# Patient Record
Sex: FEMALE | Race: WHITE | NOT HISPANIC OR LATINO
[De-identification: names, ages, dates, MRNs, and addresses within clinical notes are randomized per-mention and may not be internally consistent; named-entity substitution may affect disease eponyms.]

---

## 2021-03-16 PROBLEM — Z00.00 ENCOUNTER FOR PREVENTIVE HEALTH EXAMINATION: Status: ACTIVE | Noted: 2021-03-16

## 2021-03-25 PROBLEM — Z82.5 FAMILY HISTORY OF CHRONIC OBSTRUCTIVE PULMONARY DISEASE: Status: ACTIVE | Noted: 2021-03-23

## 2021-03-25 PROBLEM — Z80.3 FAMILY HISTORY OF MALIGNANT NEOPLASM OF BREAST: Status: ACTIVE | Noted: 2021-03-23

## 2021-03-25 PROBLEM — Z83.79 FAMILY HISTORY OF COLONIC DIVERTICULITIS: Status: ACTIVE | Noted: 2021-03-23

## 2021-03-25 PROBLEM — Z83.3 FAMILY HISTORY OF DIABETES MELLITUS: Status: ACTIVE | Noted: 2021-03-23

## 2021-03-25 PROBLEM — Z82.61 FAMILY HISTORY OF ARTHRITIS: Status: ACTIVE | Noted: 2021-03-23

## 2021-03-25 PROBLEM — Z80.1 FAMILY HISTORY OF LUNG CANCER: Status: ACTIVE | Noted: 2021-03-23

## 2021-03-25 PROBLEM — Z82.5 FAMILY HISTORY OF ASTHMA: Status: ACTIVE | Noted: 2021-03-23

## 2021-03-25 PROBLEM — Z80.0 FAMILY HISTORY OF MALIGNANT NEOPLASM OF STOMACH: Status: ACTIVE | Noted: 2021-03-23

## 2021-03-25 PROBLEM — Z80.42 FAMILY HISTORY OF MALIGNANT NEOPLASM OF PROSTATE: Status: ACTIVE | Noted: 2021-03-23

## 2021-03-25 PROBLEM — Z82.49 FAMILY HISTORY OF HEART MURMUR: Status: ACTIVE | Noted: 2021-03-23

## 2021-03-26 ENCOUNTER — APPOINTMENT (OUTPATIENT)
Dept: COLORECTAL SURGERY | Facility: CLINIC | Age: 51
End: 2021-03-26
Payer: MEDICARE

## 2021-03-26 VITALS
SYSTOLIC BLOOD PRESSURE: 142 MMHG | BODY MASS INDEX: 36.86 KG/M2 | WEIGHT: 208 LBS | HEART RATE: 108 BPM | TEMPERATURE: 98.2 F | HEIGHT: 63 IN | DIASTOLIC BLOOD PRESSURE: 79 MMHG

## 2021-03-26 DIAGNOSIS — Z80.0 FAMILY HISTORY OF MALIGNANT NEOPLASM OF DIGESTIVE ORGANS: ICD-10-CM

## 2021-03-26 DIAGNOSIS — Z83.79 FAMILY HISTORY OF OTHER DISEASES OF THE DIGESTIVE SYSTEM: ICD-10-CM

## 2021-03-26 DIAGNOSIS — Z80.3 FAMILY HISTORY OF MALIGNANT NEOPLASM OF BREAST: ICD-10-CM

## 2021-03-26 DIAGNOSIS — Z83.3 FAMILY HISTORY OF DIABETES MELLITUS: ICD-10-CM

## 2021-03-26 DIAGNOSIS — Z82.5 FAMILY HISTORY OF ASTHMA AND OTHER CHRONIC LOWER RESPIRATORY DISEASES: ICD-10-CM

## 2021-03-26 DIAGNOSIS — Z82.61 FAMILY HISTORY OF ARTHRITIS: ICD-10-CM

## 2021-03-26 DIAGNOSIS — Z82.49 FAMILY HISTORY OF ISCHEMIC HEART DISEASE AND OTHER DISEASES OF THE CIRCULATORY SYSTEM: ICD-10-CM

## 2021-03-26 DIAGNOSIS — Z80.42 FAMILY HISTORY OF MALIGNANT NEOPLASM OF PROSTATE: ICD-10-CM

## 2021-03-26 DIAGNOSIS — Z80.1 FAMILY HISTORY OF MALIGNANT NEOPLASM OF TRACHEA, BRONCHUS AND LUNG: ICD-10-CM

## 2021-03-26 PROCEDURE — 99204 OFFICE O/P NEW MOD 45 MIN: CPT

## 2021-03-26 RX ORDER — ELECTROLYTES/DEXTROSE
10 SOLUTION, ORAL ORAL
Refills: 0 | Status: ACTIVE | COMMUNITY

## 2021-03-26 RX ORDER — OXYBUTYNIN CHLORIDE 15 MG/1
15 TABLET, EXTENDED RELEASE ORAL
Refills: 0 | Status: ACTIVE | COMMUNITY

## 2021-03-26 RX ORDER — MERCAPTOPURINE 50 MG/1
50 TABLET ORAL
Refills: 0 | Status: ACTIVE | COMMUNITY

## 2021-03-26 NOTE — ASSESSMENT
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 Need for biological replacement and abdominal wall reconstruction outlined. Patient will seek care with hernia surgeon and will consider possible second opinion.

## 2021-03-26 NOTE — PHYSICAL EXAM
[Abdomen Masses] : No abdominal masses [Abdomen Tenderness] : ~T ~M Abdominal tenderness [de-identified] : Multiple scars with anterior abdominal wall with IR  drain in place. Right lower quadrant hernia

## 2021-03-26 NOTE — HISTORY OF PRESENT ILLNESS
[FreeTextEntry1] : 50 yo F presents for evaluation of Crohn's disease\par \par Initially diagnosed with Crohn's disease , currently on Remicade q 4 weeks and Mercaptopurine, followed by BENJAMIN Cortez\par PMH of AVM, seizures, aneurysm, desmoid tumor, PE 2017\par PSH of surgery for AVM 1997, coil placement for aneurysm May 1997, LEEP procedure for dysplasia 2001, , perianal rectal and vaginal reconstructive surgery for fistula 2007, laparoscopic hernia repair 17, emergency colostomy creation 17 for perforated transverse colon and DVT, colostomy reversed approx , possible in office wound debridement near former ostomy site w/ Dr. Ricks (unknown year), IR drainage of anterior abdominal wall fluid 20 and 21, abdominal catheter for fluid collection 21.\par \par Pt presents today to discuss abdominal abscesses, she continues to be followed by Gen surgeon Dr. Ricks and has a f/u on  regarding drain\par Reports symptoms began in December w/ mid abdominal pain and LLQ pelvic pain. Denies hx of fevers, nausea or vomiting.\par Has been followed by Dr. Ricks who ordered CT imaging and referred to IR for drainage.\par Pt was previously on Augmentin x 14 days, then switched to Levaquin in January x 4-6 weeks, however stopped medication last month as per Ortho/Gen Surgeon due to knee pain from prolonged use\par Pt has been having approximately monthly imaging included CT or abd US typically at [facility] and occasionally at [facility]. \par Drain continues to have brown tinged clear discharge\par Pt reports slightly soreness at former colostomy bag which has occurred intermittently over the last couple of years.\par Denies fever, n/v/c/d\par Denies passing flatus or stool via vagina or urethra, denies urine via anus\par BH: 1-2 times no complaints\par Reports adequate dietary fiber, admits could improve water intake\par Denies fiber supplement or stool softener use\par \par Last colonoscopy completed 2020\par Has not completed MRE or CTE lately\par FMH of stomach cancer in maternal grandmother, lung and prostate caner in maternal uncle, breast cancer in maternal great aunt\par Denies ASA/NSAIDs last 7 days. Takes Tylenol as needed

## 2021-05-03 ENCOUNTER — APPOINTMENT (OUTPATIENT)
Dept: SURGERY | Facility: CLINIC | Age: 51
End: 2021-05-03
Payer: MEDICARE

## 2021-05-03 VITALS
HEART RATE: 120 BPM | DIASTOLIC BLOOD PRESSURE: 84 MMHG | BODY MASS INDEX: 37.39 KG/M2 | OXYGEN SATURATION: 98 % | WEIGHT: 211 LBS | HEIGHT: 63 IN | TEMPERATURE: 97.3 F | SYSTOLIC BLOOD PRESSURE: 139 MMHG

## 2021-05-03 DIAGNOSIS — Z86.69 PERSONAL HISTORY OF OTHER DISEASES OF THE NERVOUS SYSTEM AND SENSE ORGANS: ICD-10-CM

## 2021-05-03 DIAGNOSIS — K63.2 FISTULA OF INTESTINE: ICD-10-CM

## 2021-05-03 DIAGNOSIS — K46.9 UNSPECIFIED ABDOMINAL HERNIA W/OUT OBSTRUCTION OR GANGRENE: ICD-10-CM

## 2021-05-03 DIAGNOSIS — Z86.718 PERSONAL HISTORY OF OTHER VENOUS THROMBOSIS AND EMBOLISM: ICD-10-CM

## 2021-05-03 PROCEDURE — 99205 OFFICE O/P NEW HI 60 MIN: CPT

## 2021-05-06 NOTE — REVIEW OF SYSTEMS
[Recent Weight Gain (___ Lbs)] : recent [unfilled] ~Ulb weight gain [Abdominal Pain] : abdominal pain [Incontinence] : incontinence [Joint Pain] : joint pain [Anxiety] : anxiety [Joint Swelling] : joint swelling [Depression] : depression [Negative] : Heme/Lymph

## 2021-05-07 PROBLEM — K63.2 INTESTINAL FISTULA: Status: RESOLVED | Noted: 2021-05-07 | Resolved: 2021-05-07

## 2021-05-07 PROBLEM — Z86.718 HISTORY OF DEEP VENOUS THROMBOSIS: Status: RESOLVED | Noted: 2021-05-07 | Resolved: 2021-05-07

## 2021-05-07 PROBLEM — Z86.69 HISTORY OF SEIZURE DISORDER: Status: RESOLVED | Noted: 2021-05-07 | Resolved: 2021-05-07

## 2021-05-07 PROBLEM — K63.2 ABDOMINAL WALL FISTULA: Status: RESOLVED | Noted: 2021-05-03 | Resolved: 2021-05-07

## 2021-05-07 PROBLEM — K46.9 HERNIA: Status: ACTIVE | Noted: 2021-05-03

## 2021-05-07 NOTE — ASSESSMENT
[FreeTextEntry1] : 52 y/o female with hx of complex abdominal surgeries \par \par Plan\par -CT Scan abd/pelvis - Review\par \par -Used CEDAR trang with patient to discuss and assess risk of complications to surgery at her current weight \par -Weight loss, decrease BMI with a goal of <30 for her to be medically optimized - Discussed with her that weight loss prior to surgery will be helpful.\par \par -She has follow up appoinment with  next week \par \par -Patient will send prior operative reports\par \par -Patient will send recent ultrasound and fistulogram report \par \par - Likely will require abdominal exploration and bowel resection given likely fistula of intestine - question of foreign body related to mesh - need additional information. Discuss with Dr. Echeverria.

## 2021-05-07 NOTE — HISTORY OF PRESENT ILLNESS
[de-identified] : This is a 50 y/o female with a complex abdominal surgical history presents to for evaluation and management of  abdominal wall abscesses/hernia. Reports left groin pain, mid abdominal pain and abdominal drainage started in 2020. She currently is being followed by her general surgeon, . Has a drainage catheter for chronic abdominal abscesses, placed in 2021. Last CT scan in . \par \par Initially diagnosed with Crohn's disease , currently on Remicade q 4 weeks and Mercaptopurine, followed by BENJAMIN Cortez\par PMH of AVM, seizures, aneurysm, desmoid tumor, PE 2017\par \par PSH of surgery for AVM 1997, coil placement for aneurysm May 1997, LEEP procedure for dysplasia 2001, , perianal rectal and vaginal reconstructive surgery for fistula 2007, laparoscopic hernia repair 17, emergency colostomy creation 17 for perforated transverse colon and DVT, colostomy reversed approx , possible in office wound debridement near former ostomy site w/ Dr. Ricks (unknown year), IR drainage of anterior abdominal wall fluid 20 and 21, abdominal catheter for fluid collection 21.\par \par Pt presents today to discuss abdominal abscesses, she continues to be followed by Gen surgeon Dr. Ricks and has a f/u soon.\par \par Reports symptoms began in December w/ mid abdominal pain and LLQ pelvic pain. Denies hx of fevers, nausea or vomiting.\par \par Has been followed by Dr. Ricks who ordered CT imaging and referred to IR for drainage.\par Pt was previously on Augmentin x 14 days, then switched to Levaquin in January x 4-6 weeks, however stopped medication last month as per Ortho/Gen Surgeon due to knee pain from prolonged use\par \par Pt has been having approximately monthly imaging included CT or abd US typically at [facility] and occasionally at [facility]. - Does not believe she has had an actual CT since \par Drain continues to have brown tinged clear discharge\par Pt reports slightly soreness at former colostomy bag which has occurred intermittently over the last couple of years.\par Denies fever, n/v/c/d\par Denies passing flatus or stool via vagina or urethra, denies urine via anus\par

## 2021-05-07 NOTE — PHYSICAL EXAM
[Normal Breath Sounds] : Normal breath sounds [Normal Heart Sounds] : normal heart sounds [Normal Rate and Rhythm] : normal rate and rhythm [Purpura] : no purpura  [Alert] : alert [Oriented to Person] : oriented to person [Oriented to Place] : oriented to place [Oriented to Time] : oriented to time [Calm] : calm [de-identified] : NAD, comfortable [de-identified] : Normocephalic, atraumatic. No scleral icterus.  [de-identified] : Supple, no JVD or cervical lymphadenopathy.  [de-identified] : No respiratory distress.  [de-identified] : +BS soft, nondistended. Abdominal tenderness. Multiple healed incisions. Abdominal IR drainage catheter in place.

## 2021-05-10 ENCOUNTER — RESULT REVIEW (OUTPATIENT)
Age: 51
End: 2021-05-10

## 2021-05-10 ENCOUNTER — OUTPATIENT (OUTPATIENT)
Dept: OUTPATIENT SERVICES | Facility: HOSPITAL | Age: 51
LOS: 1 days | End: 2021-05-10

## 2021-05-10 ENCOUNTER — APPOINTMENT (OUTPATIENT)
Dept: CT IMAGING | Facility: CLINIC | Age: 51
End: 2021-05-10
Payer: MEDICARE

## 2021-05-10 PROCEDURE — G1004: CPT

## 2021-05-10 PROCEDURE — 74177 CT ABD & PELVIS W/CONTRAST: CPT | Mod: 26,MG

## 2021-05-13 ENCOUNTER — NON-APPOINTMENT (OUTPATIENT)
Age: 51
End: 2021-05-13

## 2021-05-21 ENCOUNTER — APPOINTMENT (OUTPATIENT)
Dept: COLORECTAL SURGERY | Facility: CLINIC | Age: 51
End: 2021-05-21
Payer: MEDICARE

## 2021-05-21 VITALS
SYSTOLIC BLOOD PRESSURE: 130 MMHG | WEIGHT: 212 LBS | HEIGHT: 63 IN | BODY MASS INDEX: 37.56 KG/M2 | HEART RATE: 101 BPM | TEMPERATURE: 98.1 F | DIASTOLIC BLOOD PRESSURE: 84 MMHG

## 2021-05-21 PROCEDURE — 99214 OFFICE O/P EST MOD 30 MIN: CPT | Mod: 25

## 2021-05-21 PROCEDURE — 46040 I&D ISCHIORCT&/PERIRCT ABSC: CPT

## 2021-05-21 NOTE — HISTORY OF PRESENT ILLNESS
[FreeTextEntry1] : 50 y/o F presents for f/u Crohn's disease\par Initially diagnosed with Crohn's disease , currently on Remicade q 4 weeks and Mercaptopurine, followed by BENJAMIN Cortez\par PMH of AVM, seizures, aneurysm, desmoid tumor, PE 2017\par \par PSH of surgery for AVM 1997, coil placement for aneurysm May 1997, LEEP procedure for dysplasia 2001, , perianal rectal and vaginal reconstructive surgery for fistula 2007, laparoscopic hernia repair 17, emergency colostomy creation 17 for perforated transverse colon and DVT, colostomy reversed approx , possible in office wound debridement near former ostomy site w/ Dr. Ricks (unknown year), IR drainage of anterior abdominal wall fluid 20 and 21, abdominal catheter for fluid collection 21.\par \par Last seen in the office on 3/26/21 with c/f abdominal hernia. On exam, abdominal tenderness noted. Multiple scars with anterior abdominal wall with IR drain in place and a right lower quadrant hernia observed. Patient advised to seek consultation with Dr. Reynoso for hernia repair\par Initial consultation completed with Dr. Reynoso 5/3/21. She was advised on weight loss and following up with our office to discuss exploratory laparotomy and possible bowel resection \par \par \par U/s abdominal wall completed 3/19/21 at [facility]\par - surgical mesh in the anterior abdominal wall with no large fluid collections seen\par CT A/P completed 5/10/21\par - Large left lower quadrant hernia containing non-obstructed loops of small bowel.\par - Status post partial resection and anastomosis of the transverse colon.\par - Pigtail drain in the abdominal wall without residual fluid collection.\par - Subcentimeter patchy/nodular opacity in the right lung base could be due to focal atelectasis. Consider follow-up CT in 3-6 months.\par \par Reports Dr. Ricks ordered a specialized study to evaluate for abdominal fistula. 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 Ricks. Report 5-10 cc of coffee colored drainage from drain BID \par C/o issues with hemorrhoid for the past 2 days. Hemorrhoids are causing aching pain with Bm's. Denies rectal itching or bleeding. Has started using topical ointments with mild improvement in symptoms\par BH: 1-2 times daily. Soft and formed\par Denies constipation, diarrhea or straining\par Reports adequate dietary fiber intake. Currently drinking three 16 oz. bottles of water daily and occasionally several cups of lemon water \par Last colonoscopy completed 2020\par FMH of stomach cancer in maternal grandmother, lung and prostate caner in maternal uncle, breast cancer in maternal great aunt\par No ASA/NSAIDs last 7 days

## 2021-05-21 NOTE — PHYSICAL EXAM
[Abdomen Masses] : No abdominal masses [Abdomen Tenderness] : ~T ~M Abdominal tenderness [Excoriation] : no perianal excoriation [Fistula] : a fistula [3:00] : in the 3:00 position [Normal] : was normal [Anterior] : anteriorly [None] : there was no rectal mass  [de-identified] : Multiple scars with anterior abdominal wall with IR  drain in place. Right lower quadrant hernia [de-identified] : anterior anal verge-abscess.  The risks and befits of the treatment plan were reviewed and outlined with the patient. The patient understands the associated risks of the involved treatment and wishes to proceed. All questions were answered and explained.\par .  I and D performed with 3 cc  purulent material

## 2021-05-21 NOTE — ASSESSMENT
[FreeTextEntry1] : Advised local wound care. Risk of potential underlying secondary fistula due to the complex Crohn's disease was detailed.\par \par After thorough and detailed discussion we will proceed with hernia repair and possible small bowel resection after she has considerable weight loss. Likely will require 3-40 pound weight loss.\par \par 30 minutes was spent with the patient including evaluation and counseling.\par \par

## 2021-05-27 ENCOUNTER — APPOINTMENT (OUTPATIENT)
Dept: THORACIC SURGERY | Facility: CLINIC | Age: 51
End: 2021-05-27
Payer: MEDICARE

## 2021-05-27 VITALS
TEMPERATURE: 98.4 F | HEART RATE: 84 BPM | OXYGEN SATURATION: 97 % | BODY MASS INDEX: 37.74 KG/M2 | WEIGHT: 213 LBS | DIASTOLIC BLOOD PRESSURE: 84 MMHG | SYSTOLIC BLOOD PRESSURE: 145 MMHG | RESPIRATION RATE: 17 BRPM | HEIGHT: 63 IN

## 2021-05-27 PROCEDURE — 99205 OFFICE O/P NEW HI 60 MIN: CPT

## 2021-05-28 NOTE — HISTORY OF PRESENT ILLNESS
[FreeTextEntry1] : 51 year old female, never smoker, with a PMHx of Crohns disease ( on Remicade q 4 weeks and Mercaptopurine), AVM, seizures, aneurysm, desmoid tumor, PE 2017 LEEP procedure for dysplasia 2001, , perianal rectal and vaginal reconstructive surgery for fistula 2007, laparoscopic hernia repair 17, emergency colostomy creation 17 for perforated transverse colon and DVT, colostomy reversed approx , possible in office wound debridement near former ostomy site w/ Dr. Ricks (unknown year), IR drainage of anterior abdominal wall fluid 20 and 21, abdominal catheter for chronic abdominal abscess 21. Has plans to undergo hernia repair and possible small bowel resection with Dr. Echeverria after she loses weight. Recent CT abdomen and pelvis revealed nodular opacity in right lung base. She presents today for an initial evaluation. She is referred by Dr. Reynoso.\par \par U/s abdominal wall completed 3/19/21 at [facility]\par - surgical mesh in the anterior abdominal wall with no large fluid collections seen\par CT A/P completed 5/10/21\par - Large left lower quadrant hernia containing non-obstructed loops of small bowel.\par - Status post partial resection and anastomosis of the transverse colon.\par - Pigtail drain in the abdominal wall without residual fluid collection.\par - Subcentimeter patchy/nodular opacity in the right lung base could be due to focal atelectasis. Consider follow-up CT in 3-6 months.\par \par CT abd and pelvis completed on 5/10/21:\par -Large left lower quadrant hernia containing non obstructed loops of small bowel.\par -Status post partial resection and anastomosis of the transverse colon.\par -Pigtail drain in the abdominal wall without residual fluid collection.\par -Subcentimeter patchy/nodular opacity in the right lung base could be due to focal atelectasis. Consider follow-up CT in 3-6 months.\par \par \par \par \par \par \par \par

## 2021-05-28 NOTE — ASSESSMENT
[FreeTextEntry1] : 51 year old female, never smoker, with a PMHx of Crohns disease ( on Remicade q 4 weeks and Mercaptopurine), AVM, seizures, aneurysm, desmoid tumor, PE 2017 LEEP procedure for dysplasia 2001, , perianal rectal and vaginal reconstructive surgery for fistula 2007, laparoscopic hernia repair 17, emergency colostomy creation 17 for perforated transverse colon and DVT, colostomy reversed approx , possible in office wound debridement near former ostomy site w/ Dr. Ricks (unknown year), IR drainage of anterior abdominal wall fluid 20 and 21, abdominal catheter for chronic abdominal abscess 21. Has plans to undergo hernia repair and possible small bowel resection with Dr. Echeverria after she loses weight. Recent CT abdomen and pelvis revealed nodular opacity in right lung base. She presents today for an initial evaluation. She is referred by Dr. Reynoso.\par \par Patient feeling well. Denies SOB, fever/chills. \par \par CT Abdomen/Pelvis 5/10/21 reveals patchy opacity in the right lung base likely infectious/inflammatory. Right lung patchy opacity found incidentally on CT abdomen done for hernia repair. Will order dedicated CT Chest. \par \par I have reviewed the patient's medical records and diagnostic images at the time of this office consultation and have made the following recommendation.\par Plan:\par 1. CT Chest\par 2. Request old films from [facility]\par \par I reviewed the documentation recorded by the scribe in my presence and it accurately and completely records my words and actions\par \par

## 2021-05-28 NOTE — END OF VISIT
[Time Spent: ___ minutes] : I have spent [unfilled] minutes of time on the encounter. [FreeTextEntry3] : I, DEREK EDDY , am scribing for and in the presence of SANAZ ANTON the following sections: history of present illness, past medical/family/surgical/family/social history, review of systems, vital signs, physical exam, and disposition.\par

## 2021-05-28 NOTE — PHYSICAL EXAM
[] : no respiratory distress [Respiration, Rhythm And Depth] : normal respiratory rhythm and effort [Auscultation Breath Sounds / Voice Sounds] : lungs were clear to auscultation bilaterally [Apical Impulse] : the apical impulse was normal [Heart Sounds] : normal S1 and S2 [Examination Of The Chest] : the chest was normal in appearance [Abnormal Walk] : normal gait [Musculoskeletal - Swelling] : no joint swelling seen [Skin Color & Pigmentation] : normal skin color and pigmentation [Skin Turgor] : normal skin turgor [No Focal Deficits] : no focal deficits [FreeTextEntry1] : hernia

## 2021-06-05 ENCOUNTER — RESULT REVIEW (OUTPATIENT)
Age: 51
End: 2021-06-05

## 2021-06-05 ENCOUNTER — APPOINTMENT (OUTPATIENT)
Dept: CT IMAGING | Facility: CLINIC | Age: 51
End: 2021-06-05
Payer: MEDICARE

## 2021-06-05 PROCEDURE — 71250 CT THORAX DX C-: CPT | Mod: 26,MH

## 2021-06-11 ENCOUNTER — APPOINTMENT (OUTPATIENT)
Dept: COLORECTAL SURGERY | Facility: CLINIC | Age: 51
End: 2021-06-11
Payer: MEDICARE

## 2021-06-11 VITALS
SYSTOLIC BLOOD PRESSURE: 152 MMHG | TEMPERATURE: 98 F | BODY MASS INDEX: 38.27 KG/M2 | DIASTOLIC BLOOD PRESSURE: 95 MMHG | WEIGHT: 216 LBS | HEART RATE: 98 BPM | HEIGHT: 63 IN

## 2021-06-11 DIAGNOSIS — K61.0 ANAL ABSCESS: ICD-10-CM

## 2021-06-11 PROCEDURE — 99024 POSTOP FOLLOW-UP VISIT: CPT

## 2021-06-11 NOTE — PHYSICAL EXAM
[Abdomen Masses] : No abdominal masses [Abdomen Tenderness] : ~T ~M Abdominal tenderness [Excoriation] : no perianal excoriation [Fistula] : no fistulas [Normal] : was normal [Anterior] : anteriorly [None] : there was no rectal mass  [de-identified] : Multiple scars with anterior abdominal wall with IR  drain in place. Right lower quadrant hernia [de-identified] : No evidence of residual abscess. Well-healed incision and drainage site

## 2021-06-11 NOTE — HISTORY OF PRESENT ILLNESS
[FreeTextEntry1] : 50 y/o F presents for f/u Crohn's disease\par Initially diagnosed with Crohn's disease , currently on Remicade q 4 weeks and Mercaptopurine, followed by BENJAMIN Cortez\par PMH of AVM, seizures, aneurysm, desmoid tumor, PE 2017\par \par PSH of surgery for AVM 1997, coil placement for aneurysm May 1997, LEEP procedure for dysplasia 2001, , perianal rectal and vaginal reconstructive surgery for fistula 2007, laparoscopic hernia repair 17, emergency colostomy creation 17 for perforated transverse colon and DVT, colostomy reversed approx , possible in office wound debridement near former ostomy site w/ Dr. Ricks (unknown year), IR drainage of anterior abdominal wall fluid 20 and 21, abdominal catheter for fluid collection 21.\par \par \par U/s abdominal wall completed 3/19/21 at [facility]\par - surgical mesh in the anterior abdominal wall with no large fluid collections seen\par \par CT A/P completed 5/10/21\par - Large left lower quadrant hernia containing non-obstructed loops of small bowel.\par - Status post partial resection and anastomosis of the transverse colon.\par - Pigtail drain in the abdominal wall without residual fluid collection.\par - Subcentimeter patchy/nodular opacity in the right lung base could be due to focal atelectasis. Consider follow-up CT in 3-6 months.\par \par Initial consultation completed with Dr. Reynoso 5/3/21 for discussion of hernia repair. She was advised on weight loss and following up with our office to discuss combined case, including exploratory laparotomy and possible bowel resection \par \par Last seen in the office 21. Anterior abdominal wall with IR drain in place. Right lower quadrant hernia noted and on the keyanna-anal skin, a fistula in the 3:00 position and anterior anal verge-abscess. I&D performed\par \par Reports some tenderness and discomfort for the first several days after I&D\par Admits to occasional BRBPR on the TP with some but not all BM's\par Noted drainage from site for the first several days after I&D, now resolved\par Denies fever, chills, or odor\par No sitz baths \par BH: Daily\par Denies constipation,diarrhea or straining. Stools are soft and formed, occasional small amount of mucous with stools.Denies FU/FI \par No ASA last 7 days. Taking Aleve PRN for knee swelling, last taken yesterday

## 2021-06-11 NOTE — ASSESSMENT
[FreeTextEntry1] : No evidence of residual inflammation. Recommend continued observation. She will return in 3 months to address abdominal wall chronic fistula.\par

## 2021-06-18 ENCOUNTER — TRANSCRIPTION ENCOUNTER (OUTPATIENT)
Age: 51
End: 2021-06-18

## 2021-12-01 ENCOUNTER — APPOINTMENT (OUTPATIENT)
Dept: CT IMAGING | Facility: CLINIC | Age: 51
End: 2021-12-01
Payer: MEDICARE

## 2021-12-01 ENCOUNTER — RESULT REVIEW (OUTPATIENT)
Age: 51
End: 2021-12-01

## 2021-12-01 ENCOUNTER — OUTPATIENT (OUTPATIENT)
Dept: OUTPATIENT SERVICES | Facility: HOSPITAL | Age: 51
LOS: 1 days | End: 2021-12-01

## 2021-12-01 PROCEDURE — 71250 CT THORAX DX C-: CPT | Mod: 26,MH

## 2021-12-08 ENCOUNTER — NON-APPOINTMENT (OUTPATIENT)
Age: 51
End: 2021-12-08

## 2022-03-07 ENCOUNTER — APPOINTMENT (OUTPATIENT)
Dept: CT IMAGING | Facility: CLINIC | Age: 52
End: 2022-03-07
Payer: MEDICARE

## 2022-03-07 ENCOUNTER — OUTPATIENT (OUTPATIENT)
Dept: OUTPATIENT SERVICES | Facility: HOSPITAL | Age: 52
LOS: 1 days | End: 2022-03-07

## 2022-03-07 PROCEDURE — G1004: CPT

## 2022-03-07 PROCEDURE — 71250 CT THORAX DX C-: CPT | Mod: 26,MG

## 2022-03-09 ENCOUNTER — NON-APPOINTMENT (OUTPATIENT)
Age: 52
End: 2022-03-09

## 2022-03-14 DIAGNOSIS — R91.8 OTHER NONSPECIFIC ABNORMAL FINDING OF LUNG FIELD: ICD-10-CM

## 2022-03-24 ENCOUNTER — APPOINTMENT (OUTPATIENT)
Dept: THORACIC SURGERY | Facility: CLINIC | Age: 52
End: 2022-03-24
Payer: MEDICARE

## 2022-03-24 VITALS
HEIGHT: 63 IN | SYSTOLIC BLOOD PRESSURE: 116 MMHG | OXYGEN SATURATION: 96 % | DIASTOLIC BLOOD PRESSURE: 67 MMHG | WEIGHT: 221 LBS | BODY MASS INDEX: 39.16 KG/M2 | RESPIRATION RATE: 18 BRPM | HEART RATE: 88 BPM | TEMPERATURE: 97.5 F

## 2022-03-24 DIAGNOSIS — R91.8 OTHER NONSPECIFIC ABNORMAL FINDING OF LUNG FIELD: ICD-10-CM

## 2022-03-24 PROCEDURE — 99213 OFFICE O/P EST LOW 20 MIN: CPT

## 2022-03-24 RX ORDER — TRAMADOL HYDROCHLORIDE 50 MG/1
50 TABLET, COATED ORAL 4 TIMES DAILY
Refills: 0 | Status: ACTIVE | COMMUNITY

## 2022-03-24 RX ORDER — FUROSEMIDE 20 MG/1
20 TABLET ORAL
Refills: 0 | Status: DISCONTINUED | COMMUNITY
End: 2022-03-24

## 2022-03-24 RX ORDER — SUMATRIPTAN 100 MG/1
100 TABLET, FILM COATED ORAL
Refills: 0 | Status: ACTIVE | COMMUNITY

## 2022-03-24 RX ORDER — ADALIMUMAB 20MG/0.4ML
KIT SUBCUTANEOUS
Refills: 0 | Status: DISCONTINUED | COMMUNITY
End: 2022-03-24

## 2022-03-24 RX ORDER — TOPIRAMATE 100 MG/1
100 CAPSULE, EXTENDED RELEASE ORAL DAILY
Refills: 0 | Status: ACTIVE | COMMUNITY

## 2022-03-24 NOTE — PHYSICAL EXAM
[Neck Appearance] : the appearance of the neck was normal [Neck Cervical Mass (___cm)] : no neck mass was observed [Jugular Venous Distention Increased] : there was no jugular-venous distention [Thyroid Diffuse Enlargement] : the thyroid was not enlarged [Thyroid Nodule] : there were no palpable thyroid nodules [Auscultation Breath Sounds / Voice Sounds] : lungs were clear to auscultation bilaterally [] : no respiratory distress

## 2022-03-26 NOTE — HISTORY OF PRESENT ILLNESS
[FreeTextEntry1] : 52 year old female, never smoker, with a PMHx of Crohns disease ( on Remicade q 4 weeks and Mercaptopurine), AVM, seizures, aneurysm, desmoid tumor, PE 2017,  LEEP procedure for dysplasia 2001, , perianal rectal and vaginal reconstructive surgery for fistula 2007, laparoscopic hernia repair 17, emergency colostomy creation 17 for perforated transverse colon, and DVT, colostomy reversed approx , possible in office wound debridement near former ostomy site w/ Dr. Ricks (unknown year), IR drainage of anterior abdominal wall fluid 20 and 21, abdominal catheter for chronic abdominal abscess 21. Has plans to undergo hernia repair and possible small bowel resection with Dr. Echeverria. \par \par She was referred by Dr. Reynoso to Dr. Chamberlain in May 2021 for right lung base nodular opacity, incidentally found on CT abd and pelvis. She presents today to review recent CT chest. Patient reports feeling well in general. There's no fever, chills, fatigue, unintentional weight loss, anorexia, cough, chest pain, SOB, hemoptysis. \par \par U/s abdominal wall completed 3/19/21 at [facility]\par - surgical mesh in the anterior abdominal wall with no large fluid collections seen\par CT A/P completed 5/10/21\par - Large left lower quadrant hernia containing non-obstructed loops of small bowel.\par - Status post partial resection and anastomosis of the transverse colon.\par - Pigtail drain in the abdominal wall without residual fluid collection.\par - Subcentimeter patchy/nodular opacity in the right lung base could be due to focal atelectasis. Consider follow-up CT in 3-6 months.\par \par CT abd and pelvis completed on 5/10/21:\par -Large left lower quadrant hernia containing non obstructed loops of small bowel.\par -Status post partial resection and anastomosis of the transverse colon.\par -Pigtail drain in the abdominal wall without residual fluid collection.\par -Subcentimeter patchy/nodular opacity in the right lung base could be due to focal atelectasis. Consider follow-up CT in 3-6 months.\par \par CT chest on 21\par - [id number] nodular opacity overlying the diaphragm in the RLL, previously measured [id number] on 21. \par \par CT chest done on 3/7/22:\par - Since 2021, there has been a decrease in the size of a tubular opacity in the posterior right lower lobe, likely representing atelectasis or scar rather than a lung nodule.\par \par \par

## 2022-03-26 NOTE — ASSESSMENT
[FreeTextEntry1] : 52 year old female, never smoker, known to our service for surveillance of a pulmonary nodule. History of Crohn disease, followed by Dr. Echeverria. \par \par She was referred by Dr. Reynoso to Dr. Chamberlain in May 2021 for right lung base nodular opacity, incidentally found on CT abd and pelvis.\par \par CT chest on 12/01/21\par - [id number] nodular opacity overlying the diaphragm in the RLL, previously measured [id number] on 6/5/21. \par \par She presents today to review recent CT chest. Patient reports feeling well in general. There's no fever, chills, fatigue, unintentional weight loss, anorexia, cough, chest pain, SOB, hemoptysis. \par \par CT chest done on 3/7/22:\par - Since 12/1/2021, there has been a decrease in the size of a tubular opacity in the posterior right lower lobe, likely representing atelectasis or scar rather than a lung nodule.\par \par CT chest on 3/7/22 reviewed and discussed with the patient. RLL opacity decreased in size, atelectasis most likely. Patient feels well in general.  Will continue observe. Will obtain CT in 6 months to insure resolution. \par \par Plan:\par Repeat CT chest in 6 months. \par \par BHASKAR Castanon, was scribe [and  if needed] for and in the presence of Dr. CHRIS OROURKE for the following sections: history of present illness, past medical/surgical/family/social/ allergy history, review of systems, vital signs, physical exam, and disposition.\par \par MARKUS, Chris Orourke,  personally performed the services described in the documentation, reviewed the documentation recorded by the scribe in my presence and it accurately and completely records my words and actions.\par \par \par

## 2022-04-29 ENCOUNTER — APPOINTMENT (OUTPATIENT)
Dept: SURGERY | Facility: CLINIC | Age: 52
End: 2022-04-29
Payer: MEDICARE

## 2022-04-29 VITALS
HEART RATE: 94 BPM | HEIGHT: 63 IN | DIASTOLIC BLOOD PRESSURE: 94 MMHG | WEIGHT: 222 LBS | TEMPERATURE: 97.6 F | OXYGEN SATURATION: 97 % | BODY MASS INDEX: 39.34 KG/M2 | SYSTOLIC BLOOD PRESSURE: 130 MMHG

## 2022-04-29 PROCEDURE — 99213 OFFICE O/P EST LOW 20 MIN: CPT

## 2022-05-02 NOTE — REVIEW OF SYSTEMS
[Recent Weight Gain (___ Lbs)] : recent [unfilled] ~Ulb weight gain [Abdominal Pain] : abdominal pain [Incontinence] : incontinence [Joint Swelling] : joint swelling [Anxiety] : anxiety [Depression] : depression [Negative] : Heme/Lymph

## 2022-05-19 ENCOUNTER — APPOINTMENT (OUTPATIENT)
Dept: BARIATRICS | Facility: CLINIC | Age: 52
End: 2022-05-19
Payer: MEDICARE

## 2022-05-19 VITALS
BODY MASS INDEX: 38.45 KG/M2 | SYSTOLIC BLOOD PRESSURE: 101 MMHG | OXYGEN SATURATION: 96 % | TEMPERATURE: 97.6 F | HEART RATE: 97 BPM | WEIGHT: 217 LBS | DIASTOLIC BLOOD PRESSURE: 72 MMHG | HEIGHT: 63 IN

## 2022-05-19 PROCEDURE — 99214 OFFICE O/P EST MOD 30 MIN: CPT

## 2022-05-20 ENCOUNTER — NON-APPOINTMENT (OUTPATIENT)
Age: 52
End: 2022-05-20

## 2022-05-20 ENCOUNTER — APPOINTMENT (OUTPATIENT)
Dept: COLORECTAL SURGERY | Facility: CLINIC | Age: 52
End: 2022-05-20
Payer: MEDICARE

## 2022-05-20 VITALS
HEIGHT: 63 IN | HEART RATE: 111 BPM | BODY MASS INDEX: 38.09 KG/M2 | DIASTOLIC BLOOD PRESSURE: 78 MMHG | TEMPERATURE: 97.2 F | SYSTOLIC BLOOD PRESSURE: 125 MMHG | WEIGHT: 215 LBS

## 2022-05-20 PROCEDURE — 99214 OFFICE O/P EST MOD 30 MIN: CPT

## 2022-05-20 NOTE — PHYSICAL EXAM
[Abdomen Masses] : No abdominal masses [Abdomen Tenderness] : ~T ~M Abdominal tenderness [Excoriation] : no perianal excoriation [Fistula] : no fistulas [Normal] : was normal [Anterior] : anteriorly [None] : there was no rectal mass  [de-identified] : Multiple scars with anterior abdominal wall -evidence of small draining fistula at umbilicus.  Mild surrounding erythema.  Skin grossly intact.

## 2022-05-20 NOTE — HISTORY OF PRESENT ILLNESS
[FreeTextEntry1] : 51 yo F presents for f/u Crohn's disease\par Initially diagnosed with Crohn's disease , currently on Remicade q 4 weeks and Mercaptopurine, followed by BENJAMIN Cortez. Due for next infusion 22\par PMH of AVM, seizures, aneurysm, desmoid tumor, PE 2017\par \par PSH of surgery for AVM 1997, coil placement for aneurysm May 1997, LEEP procedure for dysplasia 2001, , perianal rectal and vaginal reconstructive surgery for fistula 2007, laparoscopic hernia repair 17, emergency colostomy creation 17 for perforated transverse colon and DVT, colostomy reversed approx , possible in office wound debridement near former ostomy site w/ Dr. Ricks (unknown year), IR drainage of anterior abdominal wall fluid 20 and 21, abdominal catheter for fluid collection 21.\par \par \par U/s abdominal wall completed 3/19/21 at [facility]\par - surgical mesh in the anterior abdominal wall with no large fluid collections seen\par \par CT A/P completed 5/10/21\par - Large left lower quadrant hernia containing non-obstructed loops of small bowel.\par - Status post partial resection and anastomosis of the transverse colon.\par - Pigtail drain in the abdominal wall without residual fluid collection.\par - Subcentimeter patchy/nodular opacity in the right lung base could be due to focal atelectasis. Consider follow-up CT in 3-6 months.\par \par Initial consultation completed with Dr. Reynoso 5/3/21 for discussion of hernia repair. She was advised on weight loss and following up with our office to discuss combined case, including exploratory laparotomy and possible bowel resection \par \par Seen in the office 21. Anterior abdominal wall with IR drain in place. Right lower quadrant hernia noted and on the keyanna-anal skin, a fistula in the 3:00 position and anterior anal verge-abscess. I&D performed\par Last seen 21 for follow up. Multiple scar w/ anterior abdominal wall w/ IR drain in place. RLQ hernia. No evidence of residual abscess in perianal skin. Well healed I&D site, no fistulas. Normal sphincter tone. + anterior rectal tenderness to palpation\par \par Pt recommended to f/u in 3 months to address abd wall chronic fistula\par \par In interim, referred by Dr. Reynoso to CT Dr. Shabazz and most recently seen by Dr. Christian 3/24/22 for incidental right lung base nodular opacity noted on CT a/p, s/p CT chest 2021 and repeat in 2022:\par \par CT chest on 21\par - [id number] nodular opacity overlying the diaphragm in the RLL, previously measured [id number] on 21. \par \par CT chest done on 3/7/22:\par - Since 2021, there has been a decrease in the size of a tubular opacity in the posterior right lower lobe, likely representing atelectasis or scar rather than a lung nodule.\par \par Per Dr. Christian's notes, recommend repeat CT chest in 6 months\par \par Last seen by Dr. Reynoso on 22. Pt reported difficulty with weight loss and continued interest in abdominal wall surgery planning. Referred to bariatrics for evaluation of surgical vs medical weight loss prior to consideration of abdominal wall repair. \par \par Seen by Bariatrics Dr. Winslow on 22. Per Bariatrics, would consider sleeve gastrectomy, however given increased risk recommend medical weight loss and referred to Endocrinology\par \par Pt has f/u with Endo Dr. Palmer in July. Concerned about potential complications of bariatric surgery given h/o IBD and prefers to losing weight on her own\par \par Also seen by GEN SURG Dr. Salcedo a few weeks ago, c/o internal stabbing pain at former ostomy site. Denies fever, chills, n/v/c/d. Provider prescribed Augmentin x 1 week for possible infection which patient completed.\par \par Pt presents to revisit discussion and reports BENJAMIN Cortez wanted to know if she needs to repeat colonoscopy sooner than 2022. Last colonoscopy 2 years ago which was normal, pt reports h/o colon polyps on prior scope. \par \par BH: twice daily, soft/formed\par Denies blood or mucus in stool\par Not taking fiber or stool softeners\par \par Admits soda is her "crutch", admits to drinking rum and cokes. Last had soda one week ago and Is trying to increase water intake and avoiding fast food. \par Denies receiving referral to nutrition, pt interested in Nutritional contact\par Walks her dog daily\par \par Pt reports slimy discharge w/ vinegar like odor. Reports changing dry gauze on abdomen every 2-3 days or sooner if Tegaderm peels off sooner. When due for change will allow soap and water over the area during shower\par Admits to redness surrounding fistula, which she attributes to irritation from removal adhesive. Denies itching, burning.\par Denies ASA/NSAID use

## 2022-05-20 NOTE — ASSESSMENT
[FreeTextEntry1] : Complex enterocutaneous fistula with morbid obesity.\par \par Currently clinically stable.  Recommend continued efforts at weight loss.  Advised return in 4 months for repeat evaluation.  I have discussed with her the options for possible enterostomal consultation and pouching systems to help better address her leakage and protect her skin.  She will consider these options and be in contact.

## 2022-05-23 NOTE — HISTORY OF PRESENT ILLNESS
[de-identified] : This is a 50 y/o female with a complex abdominal surgical history presents to for evaluation and management of  abdominal wall abscesses/hernia. Reports left groin pain, mid abdominal pain and abdominal drainage started in 2020. She currently is being followed by her general surgeon, . Has a drainage catheter for chronic abdominal abscesses, placed in 2021. Last CT scan in . \par \par Initially diagnosed with Crohn's disease , currently on Remicade q 4 weeks and Mercaptopurine, followed by BENJAMIN Cortez\par PMH of AVM, seizures, aneurysm, desmoid tumor, PE 2017\par \par PSH of surgery for AVM 1997, coil placement for aneurysm May 1997, LEEP procedure for dysplasia 2001, , perianal rectal and vaginal reconstructive surgery for fistula 2007, laparoscopic hernia repair 17, emergency colostomy creation 17 for perforated transverse colon and DVT, colostomy reversed approx , possible in office wound debridement near former ostomy site w/ Dr. Ricks (unknown year), IR drainage of anterior abdominal wall fluid 20 and 21, abdominal catheter for fluid collection 21.\par \par Pt presents today to discuss abdominal abscesses, she continues to be followed by Gen surgeon Dr. Ricks and has a f/u soon.\par \par Reports symptoms began in December w/ mid abdominal pain and LLQ pelvic pain. Denies hx of fevers, nausea or vomiting.\par \par Has been followed by Dr. Ricks who ordered CT imaging and referred to IR for drainage.\par Pt was previously on Augmentin x 14 days, then switched to Levaquin in January x 4-6 weeks, however stopped medication last month as per Ortho/Gen Surgeon due to knee pain from prolonged use\par \par Pt has been having approximately monthly imaging included CT or abd US typically at [facility] and [facility] at Health system. - Does not believe she has had an actual CT since \par Drain continues to have brown tinged clear discharge\par Pt reports slightly soreness at former colostomy bag which has occurred intermittently over the last couple of years.\par Denies fever, n/v/c/d\par Denies passing flatus or stool via vagina or urethra, denies urine via anus\par  [de-identified] : 4-: Returns to office. Overall about the same. Has had significant trouble losing weight. Remains with BMI of ~40 (5'3"  222lbs). Does have some persistent evidence of fistula vs foreign body infection of abdominal wall that has been managed by local wound care. Wishes to re-engage regarding abdominal wall.

## 2022-05-23 NOTE — PHYSICAL EXAM
[Normal Breath Sounds] : Normal breath sounds [Normal Heart Sounds] : normal heart sounds [Normal Rate and Rhythm] : normal rate and rhythm [Alert] : alert [Oriented to Person] : oriented to person [Oriented to Place] : oriented to place [Oriented to Time] : oriented to time [Calm] : calm [Abdominal Masses] : No abdominal masses [Abdomen Tenderness] : ~T ~M No abdominal tenderness [Tender] : was nontender [Enlarged] : not enlarged [Purpura] : no purpura  [de-identified] : NAD, comfortable [de-identified] : Normocephalic, atraumatic. No scleral icterus.  [de-identified] : Supple, no JVD or cervical lymphadenopathy.  [de-identified] : No respiratory distress.  [de-identified] : +BS soft, nondistended. Abdominal tenderness. Multiple healed incisions. Purulent fistula present.

## 2022-05-23 NOTE — ASSESSMENT
[FreeTextEntry1] : 50 y/o female with hx of complex abdominal surgeries, and chrohns disease. Fistula. We discussed that given her current situation, risk of wound complication is extraordinarily high for abdominal wall reconstructive surgery. Weight reduction should signficantly reduce her change of complication and may improve overall health trajectory. She certainly may be a candidate for weight loss options weather surgical or endoscopic given her failure with tradititional diet and exercise. I will refer to Dr. Winslow for evaluation. \par \par Discussed referral to bariatric surgery as she is interested in options for weight loss. \par

## 2022-05-26 NOTE — ASSESSMENT
[FreeTextEntry1] : Pt is a 53 y/o F with complex abdominal surgical history with abdominal wall abscesses/hernia who was referred here by Dr. Reynoso who wants to do an operation to resect small bowel but would like patient to lose weight first. Discussed with patient how if she were to undergo a bariatric surgery, I would recommend sleeve gastrectomy because of her complex abdominal surgical history and pathologies. Informed patient this surgery would carry greater risk than sleeve gastrectomies normally do. Therefore, referred patient to medical weight loss. She will follow up PRN.\par

## 2022-05-26 NOTE — END OF VISIT
[Time Spent: ___ minutes] : I have spent [unfilled] minutes of time on the encounter. [FreeTextEntry3] : All medical record entries made by the Scribe were at my, Dr. Winslow's, discretion and personally dictated by me on 05/19/2022. I have reviewed the chart and agree that the record accurately reflects my personal performance of the history, physical exam, assessment and plan. I have also personally directed, reviewed and agreed to the chart.

## 2022-05-26 NOTE — PHYSICAL EXAM
[Obese] : obese [Normal] : affect appropriate [de-identified] : normal respiration [de-identified] : large midline laparotomy from previous colostomy site

## 2022-05-26 NOTE — HISTORY OF PRESENT ILLNESS
[de-identified] : Pt is a 53 y/o F with complex abdominal surgical history with abdominal wall abscesses/hernia who was referred here by Dr. Reynoso who wants to do an operation to resect small bowel but would like patient to lose weight first. She currently weighs 217 lbs for BMI of 38 and reports she used to weigh ~140 lbs but gained weight over the past few years due to COVID and inactivity. Pt reports she's had a fistula since  and used to have a drain but it was removed. She reports changing the dressing every 2-3 days. Patient does Remicade treatments once a month for Crohn's and colitis. She has a h/o PE but is no longer on blood thinners. Pt reports she is seeing Dr. Berman tomorrow for further management. She plans to do a colonoscopy in August with her GI. Pt denies reflux, heartburn.\par Pt is s/p removal of portion of bowel with ostomy by Dr. Echeverria, desmoid tumor resection , . She has a h/o PE during one of her surgeries and h/o vaginorectal fistulas.

## 2022-06-10 ENCOUNTER — APPOINTMENT (OUTPATIENT)
Dept: SURGERY | Facility: CLINIC | Age: 52
End: 2022-06-10
Payer: MEDICARE

## 2022-06-10 VITALS
DIASTOLIC BLOOD PRESSURE: 98 MMHG | WEIGHT: 220.5 LBS | OXYGEN SATURATION: 96 % | TEMPERATURE: 95.4 F | HEART RATE: 104 BPM | BODY MASS INDEX: 39.07 KG/M2 | SYSTOLIC BLOOD PRESSURE: 141 MMHG | HEIGHT: 63 IN

## 2022-06-10 PROCEDURE — 99213 OFFICE O/P EST LOW 20 MIN: CPT

## 2022-06-15 NOTE — PHYSICAL EXAM
[Normal Breath Sounds] : Normal breath sounds [Normal Heart Sounds] : normal heart sounds [Normal Rate and Rhythm] : normal rate and rhythm [Abdominal Masses] : No abdominal masses [Abdomen Tenderness] : ~T ~M No abdominal tenderness [Tender] : was nontender [Enlarged] : not enlarged [Purpura] : no purpura  [Alert] : alert [Oriented to Person] : oriented to person [Oriented to Place] : oriented to place [Oriented to Time] : oriented to time [Calm] : calm [de-identified] : NAD, comfortable [de-identified] : Normocephalic, atraumatic. No scleral icterus.  [de-identified] : Supple, no JVD or cervical lymphadenopathy.  [de-identified] : No respiratory distress.  [de-identified] : +BS soft, nondistended. Abdominal tenderness. Multiple healed incisions. Purulent fistula present.

## 2022-06-15 NOTE — HISTORY OF PRESENT ILLNESS
[de-identified] : This is a 52 y/o female with a complex abdominal surgical history presents to for evaluation and management of  abdominal wall abscesses/hernia. Reports left groin pain, mid abdominal pain and abdominal drainage started in 2020. She currently is being followed by her general surgeon, . Has a drainage catheter for chronic abdominal abscesses, placed in 2021. Last CT scan in . \par \par Initially diagnosed with Crohn's disease , currently on Remicade q 4 weeks and Mercaptopurine, followed by BENJAMIN Cortez\par PMH of AVM, seizures, aneurysm, desmoid tumor, PE 2017\par \par PSH of surgery for AVM 1997, coil placement for aneurysm May 1997, LEEP procedure for dysplasia 2001, , perianal rectal and vaginal reconstructive surgery for fistula 2007, laparoscopic hernia repair 17, emergency colostomy creation 17 for perforated transverse colon and DVT, colostomy reversed approx , possible in office wound debridement near former ostomy site w/ Dr. Ricks (unknown year), IR drainage of anterior abdominal wall fluid 20 and 21, abdominal catheter for fluid collection 21.\par \par Pt presents today to discuss abdominal abscesses, she continues to be followed by Gen surgeon Dr. Ricks and has a f/u soon.\par \par Reports symptoms began in December w/ mid abdominal pain and LLQ pelvic pain. Denies hx of fevers, nausea or vomiting.\par \par Has been followed by Dr. Ricks who ordered CT imaging and referred to IR for drainage.\par Pt was previously on Augmentin x 14 days, then switched to Levaquin in January x 4-6 weeks, however stopped medication last month as per Ortho/Gen Surgeon due to knee pain from prolonged use\par \par Pt has been having approximately monthly imaging included CT or abd US typically at [facility] and occasionally at [facility]. - Does not believe she has had an actual CT since \par Drain continues to have brown tinged clear discharge\par Pt reports slightly soreness at former colostomy bag which has occurred intermittently over the last couple of years.\par Denies fever, n/v/c/d\par Denies passing flatus or stool via vagina or urethra, denies urine via anus\par  [de-identified] : 4-: Returns to office. Overall about the same. Has had significant trouble losing weight. Remains with BMI of ~40 (5'3"  222lbs). Does have some persistent evidence of fistula vs foreign body infection of abdominal wall that has been managed by local wound care. Wishes to re-engage regarding abdominal wall.\par \par 6- - Returns to office today. Continues to have difficulty with weight management. Saw Dr. Winslow, no bariatric surgery was offered. Discussed with her today to consider discussion with endocrinology regarding newer medical weight loss options. Overall abdominal wall remains the same.

## 2022-06-15 NOTE — ASSESSMENT
[FreeTextEntry1] : 52 y/o female with hx of complex abdominal surgeries, and chrohns disease. Fistula. We discussed that given her current situation, risk of wound complication is extraordinarily high for abdominal wall reconstructive surgery. Weight reduction should signficantly reduce her change of complication and may improve overall health trajectory. Continues to fail diet and exercise. She should discuss medical weight loss drugs with endocrinology.\par \par

## 2022-07-06 ENCOUNTER — APPOINTMENT (OUTPATIENT)
Dept: ENDOCRINOLOGY | Facility: CLINIC | Age: 52
End: 2022-07-06

## 2022-07-06 VITALS
TEMPERATURE: 97.4 F | OXYGEN SATURATION: 95 % | DIASTOLIC BLOOD PRESSURE: 85 MMHG | HEIGHT: 63 IN | WEIGHT: 220 LBS | BODY MASS INDEX: 38.98 KG/M2 | SYSTOLIC BLOOD PRESSURE: 123 MMHG | HEART RATE: 92 BPM

## 2022-07-06 PROCEDURE — 99205 OFFICE O/P NEW HI 60 MIN: CPT

## 2022-07-06 NOTE — ASSESSMENT
[Weight Loss] : weight loss [FreeTextEntry1] : Patient is a 53 yo woman establishing weight management\par \par 1. Class II Obesity/BMI 39\par Patient states struggles with weight loss over the past 2 years due to COVID/Quarantine and unemployment. Patient has a complicated hernia history and her surgeon recommends weight loss prior to hernia repair\par -nutritional counseling was recommended but states that nutrition does not accept insurance because she does not have diabetes or other qualifying diseases.  At this time, I will check an A1c level to rule out diabetes and/or prediabetes\par -discussed concept of calorie restriction. The patient's diet is high in sugars/carbohydrates and calories.  Namely, she is "addicted" to soda and we discussed the need to stop soda altogether.  Fast foods should be eliminated and she should have healthful lean proteins/complex carbohydrates and vegetables\par -medical management including metformin (use off label), Qsymia, Contrave, Saxenda were discussed. Most medicines are cost limiting but with side effects. We cannot do Contrave because of her history of seizures.  She is already on topiramate for migraines and again would not add phentermine due to PE/heart issues. Metformin is an option but side effect is diarrhea and she suffers from Crohn's Disease.  The only other potential option is GLP 1 agonist but this requires injections to abdominal wall region where she's got a chronic wound.  Finally, these medicines can be very cost limiting.\par -for now, check A1c and thyroid function tests.  Realistic option might be oral rybelsus but insurance may not cover this if she does not have diabetes\par \par Follow up in 4 weeks\par \par

## 2022-07-06 NOTE — CONSULT LETTER
[Dear  ___] : Dear  [unfilled], [Consult Letter:] : I had the pleasure of evaluating your patient, [unfilled]. [Please see my note below.] : Please see my note below. [Consult Closing:] : Thank you very much for allowing me to participate in the care of this patient.  If you have any questions, please do not hesitate to contact me. [Sincerely,] : Sincerely, [FreeTextEntry3] : Nubia Palmer MD [DrLaith  ___] : Dr. BRANDT

## 2022-07-06 NOTE — REASON FOR VISIT
[Initial Evaluation] : an initial evaluation [Weight Management/Obesity] : weight management/obesity [FreeTextEntry2] : Dr. Sher Winslow and Dr. Alex Reynoso

## 2022-07-06 NOTE — PHYSICAL EXAM
[Alert] : alert [No Acute Distress] : no acute distress [No Respiratory Distress] : no respiratory distress [No Accessory Muscle Use] : no accessory muscle use [Clear to Auscultation] : lungs were clear to auscultation bilaterally [Normal S1, S2] : normal S1 and S2 [Normal Rate] : heart rate was normal [Normal Bowel Sounds] : normal bowel sounds [Normal Gait] : normal gait [No Motor Deficits] : the motor exam was normal [Normal Affect] : the affect was normal [Normal Insight/Judgement] : insight and judgment were intact [Normal Mood] : the mood was normal [de-identified] : surgical scars, wound covered in tegaderm

## 2022-07-06 NOTE — HISTORY OF PRESENT ILLNESS
[FreeTextEntry1] : Patient is a 51 yo woman establishing endocrine care for weight management.\par \par Patient has a history of abdominal wall abscesses/complicated hernia, Crohn's Disease with weight struggles over a long period of time. Dr. Alex Reynoso wants to do hernia repairs but wants the patient to lose weight before proceeding.  Patient had consultation with bariatric surgery who recommends bariatric surgery.  Gastric sleeve was discussed.  Over the last two years, during COVID, had significant weight gain.  Patient had to stop working from the hernia and was basically depressed and eating while at home.  Patient recently got a dog and is walking it for exercise.  During COVID patient was drinking soda and reports it's "my drug of choice."  Patient was ordering fast food during quarantine and not moderating portions.  She has been looking for a nutritionist as well. She reports improved motivation to lose weight. Denies struggles with weight previous to two years ago.  Has been consistently a size 9-10 and averaging weight of 159.  Does not do WW or commercial meal programs.\par Breakfast: egg white and avoids bread/bagels; multigrain cheerios with 1% milk; tea with sugar and honey; cocoa in the winter.  Rare Starbucks\par Lunch: varies but hot dogs and side of fries\par Dinner: baked chicken, portion controlled rice; home made mashed potatoes; pasta with pesto with olive oil\par Gets outside foods about 3 times a week\par Walks dog twice a day for 20 minutes

## 2022-07-06 NOTE — REVIEW OF SYSTEMS
[Recent Weight Gain (___ Lbs)] : recent weight gain: [unfilled] lbs [Depression] : depression [Anxiety] : anxiety [Dysphagia] : no dysphagia [Dysphonia] : no dysphonia [Chest Pain] : no chest pain [Slow Heart Rate] : heart rate is not slow [Palpitations] : no palpitations [Fast Heart Rate] : heart rate is not fast [Shortness Of Breath] : no shortness of breath [Cough] : no cough [As Noted in HPI] : as noted in HPI [Nausea] : no nausea [Vomiting] : no vomiting [Diarrhea] : diarrhea [Ulcer] : ulcer [Headaches] : no headaches [Tremors] : no tremors [Cold Intolerance] : no cold intolerance [Heat Intolerance] : no heat intolerance

## 2022-07-07 LAB
ANION GAP SERPL CALC-SCNC: 9 MMOL/L
BUN SERPL-MCNC: 10 MG/DL
CALCIUM SERPL-MCNC: 9.5 MG/DL
CHLORIDE SERPL-SCNC: 102 MMOL/L
CO2 SERPL-SCNC: 26 MMOL/L
CREAT SERPL-MCNC: 0.74 MG/DL
EGFR: 98 ML/MIN/1.73M2
ESTIMATED AVERAGE GLUCOSE: 114 MG/DL
GLUCOSE SERPL-MCNC: 108 MG/DL
HBA1C MFR BLD HPLC: 5.6 %
POTASSIUM SERPL-SCNC: 4.5 MMOL/L
SODIUM SERPL-SCNC: 137 MMOL/L
T4 FREE SERPL-MCNC: 1.1 NG/DL
TSH SERPL-ACNC: 1.68 UIU/ML

## 2022-07-12 NOTE — ADDENDUM
[FreeTextEntry1] : This note was written by Belem Mccall on 05/19/2022 acting as scribe for Dr. Winslow.  Yes

## 2022-09-06 ENCOUNTER — APPOINTMENT (OUTPATIENT)
Dept: CT IMAGING | Facility: CLINIC | Age: 52
End: 2022-09-06

## 2022-09-06 ENCOUNTER — OUTPATIENT (OUTPATIENT)
Dept: OUTPATIENT SERVICES | Facility: HOSPITAL | Age: 52
LOS: 1 days | End: 2022-09-06

## 2022-09-06 PROCEDURE — 71250 CT THORAX DX C-: CPT | Mod: 26,MH

## 2022-09-08 ENCOUNTER — APPOINTMENT (OUTPATIENT)
Dept: THORACIC SURGERY | Facility: CLINIC | Age: 52
End: 2022-09-08

## 2022-09-08 PROCEDURE — 99441: CPT | Mod: 95

## 2022-09-08 NOTE — ASSESSMENT
[FreeTextEntry1] : 52 year old female, never smoker, with a PMHx of Crohns disease ( on Remicade q 4 weeks and Mercaptopurine), AVM, seizures, aneurysm, desmoid tumor, PE 2017, LEEP procedure for dysplasia 2001, , perianal rectal and vaginal reconstructive surgery for fistula 2007, laparoscopic hernia repair 17, emergency colostomy creation 17 for perforated transverse colon, and DVT, colostomy reversed approx , possible in office wound debridement near former ostomy site w/ Dr. Ricks (unknown year), IR drainage of anterior abdominal wall fluid 20 and 21, abdominal catheter for chronic abdominal abscess 21. Has plans to undergo hernia repair and possible small bowel resection with Dr. Echeverria. She presents today to review her CT chest. \par \par CT chest completed on 22:\par -recommended annual follow-up of a stable pulmonary nodule within the subpleural right lower lobe, to optimally document two-year stability. \par \par Patient denies cough, hemoptysis, shortness of breath, weight change, nausea, vomiting, diarrhea, chest pain, fever, or any recent illnesses or hospitalizations. \par \par CT chest was reviewed and is stable. Will plan for a CT chest in one year. Patient verbally understood the plan of care. \par \par Plan:\par 1. CT chest in one year \par

## 2022-09-08 NOTE — HISTORY OF PRESENT ILLNESS
[Home] : at home, [unfilled] , at the time of the visit. [Medical Office: ([facility])___] : at the medical office located in  [Verbal consent obtained from patient] : the patient, [unfilled] [FreeTextEntry1] : 52 year old female, never smoker, with a PMHx of Crohns disease ( on Remicade q 4 weeks and Mercaptopurine), AVM, seizures, aneurysm, desmoid tumor, PE 2017, LEEP procedure for dysplasia 2001, , perianal rectal and vaginal reconstructive surgery for fistula 2007, laparoscopic hernia repair 17, emergency colostomy creation 17 for perforated transverse colon, and DVT, colostomy reversed approx , possible in office wound debridement near former ostomy site w/ Dr. Ricks (unknown year), IR drainage of anterior abdominal wall fluid 20 and 21, abdominal catheter for chronic abdominal abscess 21. Has plans to undergo hernia repair and possible small bowel resection with Dr. Echeverria. She presents today to review her CT chest. \par \par CT chest completed on 22:\par -recommended annual follow-up of a stable pulmonary nodule within the subpleural right lower lobe, to optimally document two-year stability.

## 2022-09-09 ENCOUNTER — APPOINTMENT (OUTPATIENT)
Dept: ENDOCRINOLOGY | Facility: CLINIC | Age: 52
End: 2022-09-09

## 2022-09-09 VITALS
HEART RATE: 89 BPM | SYSTOLIC BLOOD PRESSURE: 124 MMHG | DIASTOLIC BLOOD PRESSURE: 84 MMHG | WEIGHT: 222 LBS | OXYGEN SATURATION: 96 % | HEIGHT: 63 IN | TEMPERATURE: 97.7 F | BODY MASS INDEX: 39.34 KG/M2

## 2022-09-09 PROCEDURE — 99214 OFFICE O/P EST MOD 30 MIN: CPT

## 2022-09-09 NOTE — ASSESSMENT
[FreeTextEntry1] : Patient is a 53 yo woman following up for weight management\par \par 1. Class II Obesity/BMI 39\par Patient states struggles with weight loss over the past 2 years due to COVID/Quarantine and unemployment. Patient has a complicated hernia history and her surgeon recommends weight loss prior to hernia repair\par -nutritional counseling was recommended but states that nutrition does not accept insurance because she does not have diabetes or other qualifying diseases. A1c at the last visit was normal\par -discussed concept of calorie restriction. The patient's diet is high in sugars/carbohydrates and calories. Namely, she is "addicted" to soda. Since the last visit, she has stopped drinking soda though she will have juice from time to time.  Diet remains fairly liberal\par -medical management including metformin (use off label), Qsymia, Contrave, Saxenda were discussed. Most medicines are cost limiting. We cannot do Contrave because of her history of seizures. She is already on topiramate for migraines and again would not add phentermine due to PE/heart issues. Metformin is an option but side effect is diarrhea and she suffers from Crohn's Disease. The only other potential option is GLP 1 agonist but this requires injections to abdominal wall region where she has chronic wounds.  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 GLP1 agonists not recommended. She's starting new medicine for Crohn's Disease and adding a medicine with side effects that outweigh benefit is not ideal at this time.\par \par Follow up with endocrine as needed\par \par  [Weight Loss] : weight loss

## 2022-09-09 NOTE — PHYSICAL EXAM
[Alert] : alert [No Acute Distress] : no acute distress [EOMI] : extra ocular movement intact [No Respiratory Distress] : no respiratory distress [No Accessory Muscle Use] : no accessory muscle use [Clear to Auscultation] : lungs were clear to auscultation bilaterally [Normal S1, S2] : normal S1 and S2 [Normal Rate] : heart rate was normal [Normal Bowel Sounds] : normal bowel sounds [Soft] : abdomen soft [Normal Gait] : normal gait [Acanthosis Nigricans] : acanthosis nigricans present [No Motor Deficits] : the motor exam was normal [Normal Affect] : the affect was normal [Normal Insight/Judgement] : insight and judgment were intact [Normal Mood] : the mood was normal

## 2022-09-09 NOTE — HISTORY OF PRESENT ILLNESS
[FreeTextEntry1] : Patient is a 51 yo woman following up for weight management.\par \par Patient has a history of abdominal wall abscesses/complicated hernia, Crohn's Disease with weight struggles over a long period of time. Dr. Alex Reynoso wants to do hernia repairs but wants the patient to lose weight before proceeding. Patient had consultation with bariatric surgery who recommends bariatric surgery. Gastric sleeve was discussed. Over the last two years, during COVID, had significant weight gain. Patient had to stop working from the hernia and was basically depressed and eating while at home. She recently got a dog and is walking it for exercise. During COVID patient was drinking soda and reports it's "my drug of choice." Patient was ordering fast food during quarantine and not moderating portions. She has been looking for a nutritionist as well. She reports improved motivation to lose weight. Denies struggles with weight previous to two years ago. Has been consistently a size 9-10 and averaging weight of 159. Does not do WW or commercial meal programs.\par She has cut back on soda but drinks cranberry juice from time to time.  Diet remains fairly unchanged but has things in moderation\par Endocrine care was established in July 2022 at which point we discussed the need for improving dietary choices. Medical management was discussed.  She is already on topiramate for migraines.  \par

## 2022-09-16 ENCOUNTER — APPOINTMENT (OUTPATIENT)
Dept: COLORECTAL SURGERY | Facility: CLINIC | Age: 52
End: 2022-09-16

## 2022-09-16 VITALS
HEIGHT: 63 IN | BODY MASS INDEX: 39.34 KG/M2 | WEIGHT: 222 LBS | DIASTOLIC BLOOD PRESSURE: 83 MMHG | HEART RATE: 103 BPM | TEMPERATURE: 97.9 F | SYSTOLIC BLOOD PRESSURE: 138 MMHG

## 2022-09-16 PROCEDURE — 99214 OFFICE O/P EST MOD 30 MIN: CPT

## 2022-09-16 NOTE — PHYSICAL EXAM
[Abdomen Masses] : No abdominal masses [Abdomen Tenderness] : ~T ~M Abdominal tenderness [Excoriation] : no perianal excoriation [Fistula] : a fistula [Normal] : was normal [Anterior] : anteriorly [None] : there was no rectal mass  [Alert] : alert [de-identified] : Multiple scars with anterior abdominal wall -evidence of small draining fistula at umbilicus.  Mild surrounding erythema.  Skin grossly intact. [de-identified] : Quiescent right and anterior/lateral labial fistula.  No erythema or induration. [de-identified] : Evidence of prior punctate skin abscess along anterior and medial thigh.  No significant tenderness or fluctuance.

## 2022-09-16 NOTE — ASSESSMENT
[FreeTextEntry1] : I reviewed with the patient that she will benefit from a potential use of Hibiclens soaps to treat her but possible recurrent skin infections.  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  If she develops further skin infections we will plan for prompt return for drainage and culture to exclude possible MRSA infection.\par \par She will continue with Crohn's therapy with her primary GI.

## 2022-09-16 NOTE — HISTORY OF PRESENT ILLNESS
[FreeTextEntry1] : 53 yo F presents for f/u Crohn's disease\par Initially diagnosed with Crohn's disease , currently on Remicade q 4 weeks and Mercaptopurine, followed by BENJAMIN Cortez. Due for next infusion 22\par PMH of AVM, seizures, aneurysm, desmoid tumor, PE 2017\par \par PSH of surgery for AVM 1997, coil placement for aneurysm May 1997, LEEP procedure for dysplasia 2001, , perianal rectal and vaginal reconstructive surgery for fistula 2007, laparoscopic hernia repair 17, emergency colostomy creation 17 for perforated transverse colon and DVT, colostomy reversed approx , possible in office wound debridement near former ostomy site w/ Dr. Ricks (unknown year), IR drainage of anterior abdominal wall fluid 20 and 21, abdominal catheter for fluid collection 21.\par \par \par U/s abdominal wall completed 3/19/21 at [facility]\par - surgical mesh in the anterior abdominal wall with no large fluid collections seen\par \par CT A/P completed 5/10/21\par - Large left lower quadrant hernia containing non-obstructed loops of small bowel.\par - Status post partial resection and anastomosis of the transverse colon.\par - Pigtail drain in the abdominal wall without residual fluid collection.\par - Subcentimeter patchy/nodular opacity in the right lung base could be due to focal atelectasis. Consider follow-up CT in 3-6 months.\par \par Initial consultation completed with Dr. Reynoso 5/3/21 for discussion of hernia repair. She was advised on weight loss and following up with our office to discuss combined case, including exploratory laparotomy and possible bowel resection \par \par Seen in the office 21. Anterior abdominal wall with IR drain in place. Right lower quadrant hernia noted and on the keyanna-anal skin, a fistula in the 3:00 position and anterior anal verge-abscess. I&D performed\par Last seen 21 for follow up. Multiple scar w/ anterior abdominal wall w/ IR drain in place. RLQ hernia. No evidence of residual abscess in perianal skin. Well healed I&D site, no fistulas. Normal sphincter tone. + anterior rectal tenderness to palpation\par \par Pt recommended to f/u in 3 months to address abd wall chronic fistula\par \par In interim, referred by Dr. Reynoso to CT Dr. Shabazz and most recently seen by Dr. Christian 3/24/22 for incidental right lung base nodular opacity noted on CT a/p, s/p CT chest 2021 and repeat in 2022:\par \par CT chest on 21\par - [id number] nodular opacity overlying the diaphragm in the RLL, previously measured [id number] on 21. \par \par CT chest done on 3/7/22:\par - Since 2021, there has been a decrease in the size of a tubular opacity in the posterior right lower lobe, likely representing atelectasis or scar rather than a lung nodule.\par \par Per Dr. Christian's notes, recommend repeat CT chest in 6 months\par \par Last seen by Dr. Reynoso on 22. Pt reported difficulty with weight loss and continued interest in abdominal wall surgery planning. Referred to bariatrics for evaluation of surgical vs medical weight loss prior to consideration of abdominal wall repair. \par \par Seen by Bariatrics Dr. Winslow on 22. Per Bariatrics, would consider sleeve gastrectomy, however given increased risk recommend medical weight loss and referred to Endocrinology\par \par Pt has f/u with Endo Dr. Palmer in July. Concerned about potential complications of bariatric surgery given h/o IBD and prefers to losing weight on her own\par \par Also seen by GEN SURG Dr. Salcedo a few weeks ago, c/o internal stabbing pain at former ostomy site. Denies fever, chills, n/v/c/d. Provider prescribed Augmentin x 1 week for possible infection which patient completed.\par \par Pt presents to revisit discussion and reports BENJAMIN Cortez wanted to know if she needs to repeat colonoscopy sooner than 2022. Last colonoscopy 2 years ago which was normal, pt reports h/o colon polyps on prior scope. \par \par BH: twice daily, soft/formed\par Denies blood or mucus in stool\par Not taking fiber or stool softeners\par \par \par Patient returns in follow-up.  Reports recent episode of developing anterior thigh and medial thigh abscesses that required the use of topical antiabscess medication.  The abscess spontaneously drained with resolution of the swelling.  Purulent discharge without feculent material noted.  Pain has resolved at this point.  Symptoms occurred 3 weeks prior.\par \par Recently saw primary GI .  Colonoscopy performed.  2 polyps removed she reports.  Was advised by primary GI that she will switch to dima Serge.\par \par \par

## 2022-09-22 ENCOUNTER — APPOINTMENT (OUTPATIENT)
Dept: DERMATOLOGY | Facility: CLINIC | Age: 52
End: 2022-09-22
Payer: MEDICARE

## 2022-09-22 PROCEDURE — 87075 CULTR BACTERIA EXCEPT BLOOD: CPT

## 2022-09-22 PROCEDURE — 99204 OFFICE O/P NEW MOD 45 MIN: CPT

## 2022-09-23 ENCOUNTER — APPOINTMENT (OUTPATIENT)
Dept: SURGERY | Facility: CLINIC | Age: 52
End: 2022-09-23

## 2022-09-23 VITALS
TEMPERATURE: 97.3 F | DIASTOLIC BLOOD PRESSURE: 88 MMHG | BODY MASS INDEX: 38.62 KG/M2 | WEIGHT: 218 LBS | HEIGHT: 63 IN | OXYGEN SATURATION: 99 % | SYSTOLIC BLOOD PRESSURE: 132 MMHG | HEART RATE: 83 BPM

## 2022-09-23 PROCEDURE — 99213 OFFICE O/P EST LOW 20 MIN: CPT

## 2022-09-24 ENCOUNTER — NON-APPOINTMENT (OUTPATIENT)
Age: 52
End: 2022-09-24

## 2022-09-26 NOTE — HISTORY OF PRESENT ILLNESS
[FreeTextEntry1] : ? 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

## 2022-09-26 NOTE — PHYSICAL EXAM
[Alert] : alert [Oriented x 3] : ~L oriented x 3 [Well Nourished] : well nourished [Conjunctiva Non-injected] : conjunctiva non-injected [No Visual Lymphadenopathy] : no visual  lymphadenopathy [No Clubbing] : no clubbing [No Edema] : no edema [No Bromhidrosis] : no bromhidrosis [No Chromhidrosis] : no chromhidrosis [FreeTextEntry3] : pih bilateral medial thighs without active furunculosis or folliculitis\par fistula anal area and abdomen with overlying bandage\par pink papules cheeks

## 2022-09-27 LAB
MRSA SPEC QL CULT: NOT DETECTED
STAPH AUREUS (SA): DETECTED

## 2022-09-27 RX ORDER — MUPIROCIN 20 MG/G
2 OINTMENT TOPICAL
Qty: 1 | Refills: 2 | Status: ACTIVE | OUTPATIENT
Start: 2022-09-27

## 2022-09-28 ENCOUNTER — NON-APPOINTMENT (OUTPATIENT)
Age: 52
End: 2022-09-28

## 2022-09-28 NOTE — HISTORY OF PRESENT ILLNESS
[de-identified] : This is a 52 y/o female with a complex abdominal surgical history presents to for evaluation and management of  abdominal wall abscesses/hernia. Reports left groin pain, mid abdominal pain and abdominal drainage started in 2020. She currently is being followed by her general surgeon, . Has a drainage catheter for chronic abdominal abscesses, placed in 2021. Last CT scan in . \par \par Initially diagnosed with Crohn's disease , currently on Remicade q 4 weeks and Mercaptopurine, followed by BENJAMIN Cortez\par PMH of AVM, seizures, aneurysm, desmoid tumor, PE 2017\par \par PSH of surgery for AVM 1997, coil placement for aneurysm May 1997, LEEP procedure for dysplasia 2001, , perianal rectal and vaginal reconstructive surgery for fistula 2007, laparoscopic hernia repair 17, emergency colostomy creation 17 for perforated transverse colon and DVT, colostomy reversed approx , possible in office wound debridement near former ostomy site w/ Dr. Ricks (unknown year), IR drainage of anterior abdominal wall fluid 20 and 21, abdominal catheter for fluid collection 21.\par \par Pt presents today to discuss abdominal abscesses, she continues to be followed by Gen surgeon Dr. Ricks and has a f/u soon.\par \par Reports symptoms began in December w/ mid abdominal pain and LLQ pelvic pain. Denies hx of fevers, nausea or vomiting.\par \par Has been followed by Dr. Ricks who ordered CT imaging and referred to IR for drainage.\par Pt was previously on Augmentin x 14 days, then switched to Levaquin in January x 4-6 weeks, however stopped medication last month as per Ortho/Gen Surgeon due to knee pain from prolonged use\par \par Pt has been having approximately monthly imaging included CT or abd US typically at [facility] and occasionally at [facility]. - Does not believe she has had an actual CT since \par Drain continues to have brown tinged clear discharge\par Pt reports slightly soreness at former colostomy bag which has occurred intermittently over the last couple of years.\par Denies fever, n/v/c/d\par Denies passing flatus or stool via vagina or urethra, denies urine via anus\par  [de-identified] : 4-: Returns to office. Overall about the same. Has had significant trouble losing weight. Remains with BMI of ~40 (5'3"  222lbs). Does have some persistent evidence of fistula vs foreign body infection of abdominal wall that has been managed by local wound care. Wishes to re-engage regarding abdominal wall.\par \par 6- - Returns to office today. Continues to have difficulty with weight management. Saw Dr. Winslow, no bariatric surgery was offered. Discussed with her today to consider discussion with endocrinology regarding newer medical weight loss options. Overall abdominal wall remains the same. \par \par 9-: Returns to office. She reports she is overall doing well today. She states she is continuing to struggle with weight loss, however she reports she has just joined a gym and is excited to get back into working out. She has seen dermatology for a recent episode of thigh abscess which spontaneously drained. Reports area is healing well with no pain or discomfort. She report she saw Dr.Lichtman mckenzie 1 month ago for discomfort at ostomy site and was prescribed antibiotics for possible infection. Reports she finished full course of antibiotics and pain has subsided. Continuing to see her GI doctor for management of crohns, she is switching medication of skyrizi. She denies any abdominal pain or discomfort. Denies any change in size of hernia. Continuing to change dressing daily.

## 2022-09-28 NOTE — ASSESSMENT
[FreeTextEntry1] : 51 y/o female with hx of complex abdominal surgeries, and Crohn's disease. Fistula. We discussed that given her current situation, risk of wound complication is extraordinarily high for abdominal wall reconstructive surgery. We discussed in detail weight loss prior to elective repair to reduce high risk of complications from surgery. She has just joined a gym and is ready to get back on track. Encouraged starting weekly routine at gym with cardiovascular exercises and muscle strength training. We discussed referral to dietician to aid with weight management. Referral provided and patient is set up with appointment. She will f/u with me in 3 months for check up. Discussed returning to office sooner if any new/worsening symptoms arise. She has my direct contact information.

## 2022-09-28 NOTE — PHYSICAL EXAM
[Normal Breath Sounds] : Normal breath sounds [Normal Heart Sounds] : normal heart sounds [Normal Rate and Rhythm] : normal rate and rhythm [Alert] : alert [Oriented to Person] : oriented to person [Oriented to Place] : oriented to place [Oriented to Time] : oriented to time [Calm] : calm [Abdominal Masses] : No abdominal masses [Abdomen Tenderness] : ~T ~M No abdominal tenderness [Tender] : was nontender [Enlarged] : not enlarged [Purpura] : no purpura  [de-identified] : NAD, comfortable [de-identified] : Normocephalic, atraumatic. No scleral icterus.  [de-identified] : Supple, no JVD or cervical lymphadenopathy.  [de-identified] : No respiratory distress.  [de-identified] : +BS soft, nondistended. Abdominal tenderness. Multiple healed incisions. Purulent fistula present, dressing in place.

## 2022-10-19 ENCOUNTER — NON-APPOINTMENT (OUTPATIENT)
Age: 52
End: 2022-10-19

## 2022-10-19 VITALS — BODY MASS INDEX: 38.27 KG/M2 | HEIGHT: 63 IN | WEIGHT: 216 LBS

## 2022-11-09 ENCOUNTER — NON-APPOINTMENT (OUTPATIENT)
Age: 52
End: 2022-11-09

## 2023-01-04 ENCOUNTER — APPOINTMENT (OUTPATIENT)
Dept: COLORECTAL SURGERY | Facility: CLINIC | Age: 53
End: 2023-01-04
Payer: MEDICARE

## 2023-01-04 VITALS
BODY MASS INDEX: 36.32 KG/M2 | HEIGHT: 63 IN | DIASTOLIC BLOOD PRESSURE: 90 MMHG | WEIGHT: 205 LBS | SYSTOLIC BLOOD PRESSURE: 130 MMHG | TEMPERATURE: 97.5 F | HEART RATE: 125 BPM

## 2023-01-04 DIAGNOSIS — L29.0 PRURITUS ANI: ICD-10-CM

## 2023-01-04 PROCEDURE — 99214 OFFICE O/P EST MOD 30 MIN: CPT

## 2023-01-04 NOTE — PHYSICAL EXAM
[Excoriation] : excoriations [Normal] : was normal [None] : there was no rectal mass  [de-identified] : mild.mod perianal excoriation ( left lateral anal margin and right posterior - evidence of quiescent anal fistula disease. No abscess.

## 2023-01-04 NOTE — ASSESSMENT
[FreeTextEntry1] : Advised the patient of the etiology and treatment of pruritus ani.  Recommendations including appropriate perianal hygiene changes, avoidance of soaps and astringents, lubricating lotions and avoidance of excessive wiping detailed.\par \par Advised increasing fiber regimen.\par \par Advised consultation with gyn for STI testing.\par \par [place] office in 5 days if symptoms persist for sti testing.\par \par

## 2023-01-04 NOTE — HISTORY OF PRESENT ILLNESS
[FreeTextEntry1] : 53 yo F presents for f/u Crohn's disease\par Initially diagnosed with Crohn's disease , was on Remicade q 4 weeks and Mercaptopurine, followed by BENJAMIN Cortez w/ plans to start Skirizi (risankizumab-rzaa) 2022\par PMH of AVM, seizures, aneurysm, desmoid tumor, PE 2017\par \par PSH of surgery for AVM 1997, coil placement for aneurysm May 1997, LEEP procedure for dysplasia 2001, , perianal rectal and vaginal reconstructive surgery for fistula 2007, laparoscopic hernia repair 17, emergency colostomy creation 17 for perforated transverse colon and DVT, colostomy reversed approx , possible in office wound debridement near former ostomy site w/ Dr. Ricks (unknown year), IR drainage of anterior abdominal wall fluid 20 and 21, abdominal catheter for fluid collection 21.\par \par \par U/s abdominal wall completed 3/19/21 at [facility]\par - surgical mesh in the anterior abdominal wall with no large fluid collections seen\par \par CT A/P completed 5/10/21\par - Large left lower quadrant hernia containing non-obstructed loops of small bowel.\par - Status post partial resection and anastomosis of the transverse colon.\par - Pigtail drain in the abdominal wall without residual fluid collection.\par - Subcentimeter patchy/nodular opacity in the right lung base could be due to focal atelectasis. Consider follow-up CT in 3-6 months.\par \par Initial consultation completed with Dr. Reynoso 5/3/21 for discussion of hernia repair. She was advised on weight loss and following up with our office to discuss combined case, including exploratory laparotomy and possible bowel resection \par \par Seen in the office 21. Anterior abdominal wall with IR drain in place. Right lower quadrant hernia noted and on the keyanna-anal skin, a fistula in the 3:00 position and anterior anal verge-abscess. I&D performed\par Seen 21 for followup. Multiple scar w/ anterior abdominal wall w/ IR drain in place. RLQ hernia. No evidence of residual abscess in perianal skin. Well healed I&D site, no fistulas. Normal sphincter tone. + anterior rectal tenderness to palpation\par \par Pt recommended to f/u in 3 months to address abd wall chronic fistula\par \par In interim, referred by Dr. Reynoso to CT Surgeon Dr. Shabazz and seen by Dr. Christian 3/24/22 for incidental right lung base nodular opacity noted on CT a/p, s/p CT chest 2021 and repeat in 2022:\par \par CT chest on 21\par - [id number] nodular opacity overlying the diaphragm in the RLL, previously measured [id number] on 21. \par \par CT chest done on 3/7/22:\par - Since 2021, there has been a decrease in the size of a tubular opacity in the posterior right lower lobe, likely representing atelectasis or scar rather than a lung nodule.\par \par Per Dr. Christian's notes, recommend repeat CT chest in 6 months, which was completed 22 and noted stable pulmonary nodule within subpleural RLL, recommend repeat in one year\par \par Seen by Dr. Reynoso on 22. Pt reported difficulty with weight loss and continued interest in abdominal wall surgery planning. Referred to bariatrics for evaluation of surgical vs medical weight loss prior to consideration of abdominal wall repair. \par \par Seen by Bariatrics Dr. Winslow on 22. Per Bariatrics, would consider sleeve gastrectomy, however given increased risk recommend medical weight loss and referred to Endocrinology\par \par Also seen by GEN SURG Dr. Salcedo (~April/May 2022) c/o internal stabbing pain at former ostomy site. Denies fever, chills, n/v/c/d. Provider prescribed Augmentin x 1 week for possible infection which patient completed.\par \par Seen in this office 22 to revisit discussion and reports BENJAMIN Cortez wanted to know if she needs to repeat colonoscopy sooner than 2022. Last colonoscopy 2 years ago which was normal, pt reports h/o colon polyps on prior scope. \par Complex enterocutaneous fistula with morbid obesity.\par Currently clinically stable. Recommend continued efforts at weight loss. Advised return in 4 months for repeat evaluation. I have discussed with her the options for possible enterostomal consultation and pouching systems to help better address her leakage and protect her skin. She will consider these options and be in contact. \par \par Seen by ENDO Dr. Nubia Palmer 22, per pt, nutrition not covered by insurance given no covered diagnosis. Medical management options discussed, however given comorbidities and possible side effects many medications are not an option. Plan to check A1C to r/o DMII. If diagnosed w/ DMII, may consider starting oral rybelsus. A1C 5.6% and WNL. Last seen by ENDO 22, dietary counseling provided.\par \par Last seen in this office 22,  recent episode of developing anterior thigh and medial thigh abscesses that required the use of topical antiabscess medication. The abscess spontaneously drained with resolution of the swelling. Purulent discharge without feculent material noted. Pain has resolved at this point. Symptoms occurred 3 weeks prior.\par \par Recently saw primary GI . Colonoscopy performed. 2 polyps removed she reports. Was advised by primary GI that she will switch to Skirizi (risankizumab-rzaa). Evidence of prior punctate skin abscess along anterior and medial thigh. Pt recommended to use Hibiclens soap and recommended f/u with DERM\par Seen by DERM Dr. Tripp on 22, advised to start benzoyl peroxide wash, started on topical clindamycin and tested for MRSA/MSSA carriage.\par \par Seen by GEN SURG Dr. Reynoso on 22, encouraged on weight loss with physical activity, referral provided to dietician, recommend f/u in 3 months (Appt scheduled 3/3/23)\par Seen by Bariatrics 10/19/22 and detailed nutrition plan discussed.\par \par Presents today with complains of 1 week of perianal irritation. preceding anal swelling now improved.\par Reports with complaints of recent diarrhea secondary to antibiotic use for a breast abscess.\par \par Patient reports possible recent unprotected sex.\par

## 2023-01-05 ENCOUNTER — NON-APPOINTMENT (OUTPATIENT)
Age: 53
End: 2023-01-05

## 2023-02-14 ENCOUNTER — LABORATORY RESULT (OUTPATIENT)
Age: 53
End: 2023-02-14

## 2023-02-15 ENCOUNTER — APPOINTMENT (OUTPATIENT)
Dept: DERMATOLOGY | Facility: CLINIC | Age: 53
End: 2023-02-15
Payer: MEDICARE

## 2023-02-15 DIAGNOSIS — L50.9 URTICARIA, UNSPECIFIED: ICD-10-CM

## 2023-02-15 DIAGNOSIS — Z22.321 CARRIER OR SUSPECTED CARRIER OF METHICILLIN SUSCEPTIBLE STAPHYLOCOCCUS AUREUS: ICD-10-CM

## 2023-02-15 PROCEDURE — 87075 CULTR BACTERIA EXCEPT BLOOD: CPT

## 2023-02-15 PROCEDURE — 99214 OFFICE O/P EST MOD 30 MIN: CPT

## 2023-02-15 RX ORDER — TRIAMCINOLONE ACETONIDE 1 MG/G
0.1 CREAM TOPICAL
Qty: 1 | Refills: 0 | Status: ACTIVE | COMMUNITY
Start: 2023-02-15 | End: 1900-01-01

## 2023-02-15 NOTE — HISTORY OF PRESENT ILLNESS
[FreeTextEntry1] : fu rosacea [de-identified] : lv started metrocream - working well\par also bpo, clinda for groin - sa detected in groin fold used mupirocin for 2 mos\par pharmacy needs clinda lotion instead of gel\par \par IBD with fistulas - remicade was keeping at bay, november switched to skyrizi started getting abscesses \par last week got shot (instead of IV) and got hives all over a couple hours later, next one due in 8 weeks \par was an autoinjector pen

## 2023-02-15 NOTE — PHYSICAL EXAM
[Alert] : alert [Oriented x 3] : ~L oriented x 3 [Well Nourished] : well nourished [Conjunctiva Non-injected] : conjunctiva non-injected [No Visual Lymphadenopathy] : no visual  lymphadenopathy [No Clubbing] : no clubbing [No Edema] : no edema [No Bromhidrosis] : no bromhidrosis [No Chromhidrosis] : no chromhidrosis [FreeTextEntry3] : healing pink furuncle L breast\par no groin lesions\par few pink papules cheeks

## 2023-02-20 ENCOUNTER — NON-APPOINTMENT (OUTPATIENT)
Age: 53
End: 2023-02-20

## 2023-02-22 ENCOUNTER — NON-APPOINTMENT (OUTPATIENT)
Age: 53
End: 2023-02-22

## 2023-03-03 ENCOUNTER — APPOINTMENT (OUTPATIENT)
Dept: SURGERY | Facility: CLINIC | Age: 53
End: 2023-03-03
Payer: MEDICARE

## 2023-03-03 VITALS
TEMPERATURE: 97 F | SYSTOLIC BLOOD PRESSURE: 138 MMHG | BODY MASS INDEX: 35.44 KG/M2 | WEIGHT: 200 LBS | OXYGEN SATURATION: 96 % | DIASTOLIC BLOOD PRESSURE: 88 MMHG | HEIGHT: 63 IN | HEART RATE: 87 BPM

## 2023-03-03 DIAGNOSIS — T85.79XA INFECTION AND INFLAMMATORY REACTION DUE TO OTHER INTERNAL PROSTHETIC DEVICES, IMPLANTS AND GRAFTS, INITIAL ENCOUNTER: ICD-10-CM

## 2023-03-03 PROCEDURE — 99214 OFFICE O/P EST MOD 30 MIN: CPT

## 2023-03-07 ENCOUNTER — APPOINTMENT (OUTPATIENT)
Dept: ENDOCRINOLOGY | Facility: CLINIC | Age: 53
End: 2023-03-07
Payer: MEDICARE

## 2023-03-07 VITALS
HEART RATE: 90 BPM | DIASTOLIC BLOOD PRESSURE: 86 MMHG | TEMPERATURE: 97.4 F | BODY MASS INDEX: 35.79 KG/M2 | HEIGHT: 63 IN | SYSTOLIC BLOOD PRESSURE: 136 MMHG | WEIGHT: 202 LBS | OXYGEN SATURATION: 96 %

## 2023-03-07 DIAGNOSIS — E66.01 MORBID (SEVERE) OBESITY DUE TO EXCESS CALORIES: ICD-10-CM

## 2023-03-07 PROCEDURE — 99214 OFFICE O/P EST MOD 30 MIN: CPT

## 2023-03-07 NOTE — REVIEW OF SYSTEMS
[Fatigue] : no fatigue [Decreased Appetite] : appetite not decreased [Dysphagia] : no dysphagia [Dysphonia] : no dysphonia [Chest Pain] : no chest pain [Slow Heart Rate] : heart rate is not slow [Palpitations] : no palpitations [Fast Heart Rate] : heart rate is not fast [Shortness Of Breath] : no shortness of breath [Cough] : no cough [Nausea] : no nausea [Constipation] : no constipation [Vomiting] : no vomiting [Diarrhea] : no diarrhea [Depression] : no depression [Cold Intolerance] : no cold intolerance [Heat Intolerance] : no heat intolerance

## 2023-03-07 NOTE — HISTORY OF PRESENT ILLNESS
[FreeTextEntry1] : Patient is a 54 yo woman following up for weight management.\par \par Patient has a history of abdominal wall abscesses/complicated hernia, Crohn's Disease with weight struggles over a long period of time. Dr. Alex Reynoso wants to do hernia repairs but wants the patient to lose weight before proceeding. Patient had consultation with bariatric surgery who recommends bariatric surgery. Gastric sleeve was discussed. Over the last two years, during COVID, had significant weight gain.\par Patient still has her dog and is walking it for exercise. During COVID patient was drinking soda and reports it's "my drug of choice." Patient was ordering fast food during quarantine and not moderating portions. She has been looking for a nutritionist as well. She reports improved motivation to lose weight. \par Endocrine care was established in July 2022 at which point we discussed the need for improving dietary choices. Medical management was discussed. She is already on topiramate for migraines. \par Patient is no longer on remicade, sulfasalazine.  A new medicine was started but it caused all these new skin lesions/boils. As such, this new medicine was discontinued. \par Patient has eliminated soda. For her sweet cravings, she will have a small taste of juice or a smoothie.  Occasional rum and coke but very minimal.  Patient is eating more salads, greens and fruits.

## 2023-03-07 NOTE — ASSESSMENT
[Weight Loss] : weight loss [FreeTextEntry1] : Patient is a 52 yo woman following up for weight management\par \par 1. Class II Obesity/BMI 39\par Patient states struggles with weight loss over the past 2 years due to COVID/Quarantine and unemployment. Patient has a complicated hernia history and her surgeon recommends weight loss prior to hernia repair. Since the last visit, she has had good weight loss but surgeon wants 30 lbs additional weight loss\par -nutritional counseling was provided and she has made significant changes\par -discussed concept of calorie restriction and she is eating healthier\par -medical management including metformin (use off label), Qsymia, Contrave, Saxenda were discussed. Most medicines are cost limiting. We cannot do Contrave because of her history of seizures. She is already on topiramate for migraines and again would not add phentermine due to PE/heart issues. Metformin is an option but side effect is diarrhea and she suffers from Crohn's Disease. The only other potential option is GLP 1 agonist but this requires injections to abdominal wall region where she has chronic wounds. 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 GLP1 agonists not recommended. She's starting new medicine for Crohn's Disease and adding a medicine with side effects that outweigh benefit is not ideal at this time.\par -patient is amenable to topamax for weight loss.  Start daily topamax.  \par \par Follow up with endocrine in 4-6 weeks\par \par

## 2023-03-07 NOTE — PHYSICAL EXAM
[Alert] : alert [Well Nourished] : well nourished [No Acute Distress] : no acute distress [EOMI] : extra ocular movement intact [No Respiratory Distress] : no respiratory distress [No Accessory Muscle Use] : no accessory muscle use [Clear to Auscultation] : lungs were clear to auscultation bilaterally [Normal S1, S2] : normal S1 and S2 [Normal Rate] : heart rate was normal [Normal Bowel Sounds] : normal bowel sounds [Soft] : abdomen soft [Normal Gait] : normal gait [Acanthosis Nigricans] : acanthosis nigricans present [No Motor Deficits] : the motor exam was normal [Normal Affect] : the affect was normal [Normal Insight/Judgement] : insight and judgment were intact [Normal Mood] : the mood was normal

## 2023-03-07 NOTE — HISTORY OF PRESENT ILLNESS
[de-identified] : This is a 50 y/o female with a complex abdominal surgical history presents to for evaluation and management of  abdominal wall abscesses/hernia. Reports left groin pain, mid abdominal pain and abdominal drainage started in 2020. She currently is being followed by her general surgeon, . Has a drainage catheter for chronic abdominal abscesses, placed in 2021. Last CT scan in . \par \par Initially diagnosed with Crohn's disease , currently on Remicade q 4 weeks and Mercaptopurine, followed by BENJAMIN Cortez\par PMH of AVM, seizures, aneurysm, desmoid tumor, PE 2017\par \par PSH of surgery for AVM 1997, coil placement for aneurysm May 1997, LEEP procedure for dysplasia 2001, , perianal rectal and vaginal reconstructive surgery for fistula 2007, laparoscopic hernia repair 17, emergency colostomy creation 17 for perforated transverse colon and DVT, colostomy reversed approx , possible in office wound debridement near former ostomy site w/ Dr. Ricks (unknown year), IR drainage of anterior abdominal wall fluid 20 and 21, abdominal catheter for fluid collection 21.\par \par Pt presents today to discuss abdominal abscesses, she continues to be followed by Gen surgeon Dr. Ricks and has a f/u soon.\par \par Reports symptoms began in December w/ mid abdominal pain and LLQ pelvic pain. Denies hx of fevers, nausea or vomiting.\par \par Has been followed by Dr. Ricks who ordered CT imaging and referred to IR for drainage.\par Pt was previously on Augmentin x 14 days, then switched to Levaquin in January x 4-6 weeks, however stopped medication last month as per Ortho/Gen Surgeon due to knee pain from prolonged use\par \par Pt has been having approximately monthly imaging included CT or abd US typically at [facility] and occasionally at [facility]. - Does not believe she has had an actual CT since \par Drain continues to have brown tinged clear discharge\par Pt reports slightly soreness at former colostomy bag which has occurred intermittently over the last couple of years.\par Denies fever, n/v/c/d\par Denies passing flatus or stool via vagina or urethra, denies urine via anus [de-identified] : 4-: Returns to office. Overall about the same. Has had significant trouble losing weight. Remains with BMI of ~40 (5'3"  222lbs). Does have some persistent evidence of fistula vs foreign body infection of abdominal wall that has been managed by local wound care. Wishes to re-engage regarding abdominal wall.\par \par 6- - Returns to office today. Continues to have difficulty with weight management. Saw Dr. Winslow, no bariatric surgery was offered. Discussed with her today to consider discussion with endocrinology regarding newer medical weight loss options. Overall abdominal wall remains the same. \par \par 9-: Returns to office. She reports she is overall doing well today. She states she is continuing to struggle with weight loss, however she reports she has just joined a gym and is excited to get back into working out. She has seen dermatology for a recent episode of thigh abscess which spontaneously drained. Reports area is healing well with no pain or discomfort. She report she saw Dr.Lichtman mckenzie 1 month ago for discomfort at ostomy site and was prescribed antibiotics for possible infection. Reports she finished full course of antibiotics and pain has subsided. Continuing to see her GI doctor for management of crohns, she is switching medication of skyrizi. She denies any abdominal pain or discomfort. Denies any change in size of hernia. Continuing to change dressing daily. \par \par 3-3-23: Returns to office. Overall doing well. She has done great with weight loss and reports she is overall feeling well. Has been working with dietician. She denies any abdominal discomfort or pain.

## 2023-03-07 NOTE — ASSESSMENT
[FreeTextEntry1] : 52 y/o female with hx of complex abdominal surgeries, and Crohn's disease. Fistula. We discussed again that given her current situation, risk of wound complication is extraordinarily high for abdominal wall reconstructive surgery. She has done great with weight loss over the past couple of months, working with dietician. We discussed continuing weight loss prior to elective repair. She will return to endocrinology for consideration of medical weight loss to aid with her weight loss. She has a GI appointment on 4/3. She will RTC in 4 months for follow up and we will plan to repeat CT abd/pelvis for surgical planning. Discussed returning to office sooner if any new/worsening symptoms arise. \par \par Plan:\par -Continued weight loss (working with dietician/referral to endocrinology for medical weight loss)\par -GI appointment 4/3\par -RTC in 4 months (plan for repeat CT for surgical planning)\par -Surgery will likely be combined with plastics and colorectal

## 2023-03-07 NOTE — PHYSICAL EXAM
[Normal Breath Sounds] : Normal breath sounds [Normal Heart Sounds] : normal heart sounds [Normal Rate and Rhythm] : normal rate and rhythm [Alert] : alert [Oriented to Person] : oriented to person [Oriented to Place] : oriented to place [Oriented to Time] : oriented to time [Calm] : calm [Abdominal Masses] : No abdominal masses [Abdomen Tenderness] : ~T ~M No abdominal tenderness [Tender] : was nontender [Enlarged] : not enlarged [Purpura] : no purpura  [de-identified] : NAD, comfortable [de-identified] : Normocephalic, atraumatic. No scleral icterus.  [de-identified] : Supple, no JVD or cervical lymphadenopathy.  [de-identified] : No respiratory distress.  [de-identified] : +BS soft, nondistended. Abdominal tenderness. Multiple healed incisions. Purulent fistula present, dressing in place.

## 2023-03-31 ENCOUNTER — APPOINTMENT (OUTPATIENT)
Dept: COLORECTAL SURGERY | Facility: CLINIC | Age: 53
End: 2023-03-31
Payer: MEDICARE

## 2023-03-31 VITALS
BODY MASS INDEX: 35.44 KG/M2 | HEART RATE: 142 BPM | HEIGHT: 63 IN | TEMPERATURE: 98.7 F | WEIGHT: 200 LBS | DIASTOLIC BLOOD PRESSURE: 80 MMHG | SYSTOLIC BLOOD PRESSURE: 115 MMHG

## 2023-03-31 DIAGNOSIS — Z11.3 ENCOUNTER FOR SCREENING FOR INFECTIONS WITH A PREDOMINANTLY SEXUAL MODE OF TRANSMISSION: ICD-10-CM

## 2023-03-31 DIAGNOSIS — K62.6 ULCER OF ANUS AND RECTUM: ICD-10-CM

## 2023-03-31 DIAGNOSIS — A49.1 STREPTOCOCCAL INFECTION, UNSPECIFIED SITE: ICD-10-CM

## 2023-03-31 PROCEDURE — 99214 OFFICE O/P EST MOD 30 MIN: CPT

## 2023-03-31 NOTE — ASSESSMENT
[FreeTextEntry1] : I have recommended the patient continue with a barrier protective ointment and follow-up results of cultures obtained today.  I have recommended she seek repeat evaluation with dermatology for further assessment of the skin ulcerations of the anal margin and gluteal skin.\par \par In regard to her chronic abdominal fistula she will follow-up with weight loss planned and consultation with Dr. Reynoso–general surgery.  Recommend wound care follow-up for further management.

## 2023-03-31 NOTE — HISTORY OF PRESENT ILLNESS
[FreeTextEntry1] : 54 y/o F presents for follow up evaluation of Crohn's disease and pruritus ani\par \par Patient initially diagnosed with Crohn's disease in , Initially diagnosed with Crohn's disease , was on Remicade q 4 weeks and Mercaptopurine, followed by BENJAMIN Cortez w/ plans to start Skyrizi (risankizumab-rzaa) 2022\par PMH of AVM, seizures, aneurysm, desmoid tumor, PE 2017\par \par PSH of surgery for AVM 1997, coil placement for aneurysm May 1997, LEEP procedure for dysplasia 2001, , perianal rectal and vaginal reconstructive surgery for fistula 2007, laparoscopic hernia repair 17, emergency colostomy creation 17 for perforated transverse colon and DVT, colostomy reversed approx , possible in office wound debridement near former ostomy site w/ Dr. Ricks (unknown year), IR drainage of anterior abdominal wall fluid 20 and 21, abdominal catheter for fluid collection 21.\par \par Complex history in the setting of hernia repair with abdominal wall chronic fistula, patient initially seen by Dr. Reynoso 5/3/21 for discussion of hernia repair. Weight loss advised. \par \par Seen 21 w/ anterior Anterior abdominal wall with IR drain in place. RLQ noted and on the keyanna-anal skin, a fistula in the 3:00 position and anterior anal verge-abscess. I&D performed. \par \par Incidental right lung base nodular opacity noted on CT A/P by Dr. Reynoso and Dr. Chamberlain. She was seen by Dr. Christian 3/24/22, s/p CT chest 2021 and repeat in 3/2022. \par \par CT chest on 21\par - [id number] nodular opacity overlying the diaphragm in the RLL, previously measured [id number] on 21. \par \par CT chest done on 3/7/22:\par - Since 2021, there has been a decrease in the size of a tubular opacity in the posterior right lower lobe, likely representing atelectasis or scar rather than a lung nodule.\par \par Per Dr. Christian stable pulmonary nodule within subpleural RLL, recommended repeat CT in 1 year. \par \par Seen by multiple providers in efforts for weight loss prior to discussion of abdominal wall surgery. \par \par seen in this office 22, recent episode of developing anterior thigh and medial thigh abscesses that required the use of topical antiabscess medication. The abscess spontaneously drained with resolution of the swelling. Purulent discharge without feculent material noted. Pain has resolved at this point. Symptoms occurred 3 weeks prior.\par \par Recently saw primary GI . Colonoscopy performed. 2 polyps removed she reports. Was advised by primary GI that she will switch to Skirizi (risankizumab-rzaa). Evidence of prior punctate skin abscess along anterior and medial thigh. Pt recommended to use Hibiclens soap and recommended f/u with DERM\par Seen by DERM Dr. Tripp on 22, advised to start benzoyl peroxide wash, started on topical clindamycin and tested for MRSA/MSSA carriage.\par \par Seen by GEN SURG Dr. Reynoso on 22, encouraged on weight loss with physical activity, referral provided to dietician, recommend f/u in 3 months (Appt scheduled 3/3/23)\par Seen by Bariatrics 10/19/22 and detailed nutrition plan discussed.\par \par Most recently seen in follow up 23, perianal skin excoriations and mild- moderate perianal excoriation ( left lateral anal margin and right posterior evidence of quiescent anal fistula disease. No abscess. The sphincter tone was normal. There was no rectal tenderness present. \par Hygiene recommendations given, advised f/u w/ GYN for STI testing\par \par Pt has been feeling anal irritation and "rawness" in setting of increased BMs from once or twice daily and now three times daily. She's tried topical creams such as A&D w/o much improvement, but feels applying hot towel and pat dries the area and applies vasoline. Admits to scant BRB on TP. \par Denies abd pain, vomiting. h/o taking Skyrizi 2022 q 8 weeks through she stopped 23 due to recurrent abscess in groin area and below left breast. Pt reports she previously tolerated Remicade and has f/u with GI next week to discuss IBD medication options\par \par Of note, h/o chronic abdominal wound and hernia. Last seen by GEN SURG Edgardo on 3/3, advised to continue weight loss and f/u in 4 mo for CT scan and consideration of surgery combined w/ plastics and CRS.. Pt has since lost 20 lbs and goal to lose another 2-30 lbs by end of summer for consideration of surgery w/ Next f/u with Dr. Reynoso 23. Pt requesting to see wound care nurse for consultation.\par

## 2023-03-31 NOTE — PHYSICAL EXAM
[Excoriation] : excoriations [Normal] : was normal [None] : there was no rectal mass  [de-identified] : mild.mod perianal excoriation ( left lateral anal margin and right posterior - evidence of quiescent anal fistula disease. No abscess.  Evidence of multiple superficial skin ulcerations involving left and right anal margin as well as posterior medial gluteal cleft

## 2023-04-06 PROBLEM — A49.1 GROUP B STREPTOCOCCAL INFECTION: Status: ACTIVE | Noted: 2023-04-06

## 2023-04-06 LAB
BACTERIA WND CULT: ABNORMAL
C TRACH RRNA SPEC QL NAA+PROBE: NOT DETECTED
HHV SPEC CULT: NORMAL
HSV TYPE 1: NORMAL
HSV TYPE 2: NORMAL
N GONORRHOEA RRNA SPEC QL NAA+PROBE: NOT DETECTED
SOURCE ANAL: NORMAL

## 2023-05-08 ENCOUNTER — APPOINTMENT (OUTPATIENT)
Dept: ENDOCRINOLOGY | Facility: CLINIC | Age: 53
End: 2023-05-08
Payer: MEDICARE

## 2023-05-08 VITALS
TEMPERATURE: 97.7 F | DIASTOLIC BLOOD PRESSURE: 88 MMHG | WEIGHT: 192 LBS | HEIGHT: 63 IN | BODY MASS INDEX: 34.02 KG/M2 | OXYGEN SATURATION: 98 % | HEART RATE: 70 BPM | SYSTOLIC BLOOD PRESSURE: 123 MMHG

## 2023-05-08 PROCEDURE — 99214 OFFICE O/P EST MOD 30 MIN: CPT

## 2023-05-08 RX ORDER — TOPIRAMATE 15 MG/1
15 CAPSULE, COATED PELLETS ORAL DAILY
Qty: 1 | Refills: 0 | Status: DISCONTINUED | COMMUNITY
Start: 2023-03-07 | End: 2023-05-08

## 2023-05-08 NOTE — PHYSICAL EXAM
[Alert] : alert [Well Nourished] : well nourished [Healthy Appearance] : healthy appearance [No Acute Distress] : no acute distress [EOMI] : extra ocular movement intact [Normal Hearing] : hearing was normal [No Respiratory Distress] : no respiratory distress [No Accessory Muscle Use] : no accessory muscle use [Clear to Auscultation] : lungs were clear to auscultation bilaterally [Normal S1, S2] : normal S1 and S2 [Normal Rate] : heart rate was normal [Normal Bowel Sounds] : normal bowel sounds [Soft] : abdomen soft [Normal Gait] : normal gait [Acanthosis Nigricans] : acanthosis nigricans present [No Motor Deficits] : the motor exam was normal [Normal Affect] : the affect was normal [Normal Insight/Judgement] : insight and judgment were intact [Normal Mood] : the mood was normal [de-identified] : erythema, fistula covered in bandage

## 2023-05-08 NOTE — ASSESSMENT
[Weight Loss] : weight loss [FreeTextEntry1] : Patient is a 54 yo woman following up for weight management\par \par 1. Class II Obesity/BMI 39\par Patient states struggles with weight loss over the since the COVID pandemic and unemployment. Patient has a complicated hernia history and her surgeon recommends weight loss prior to hernia repair. Since the last visit, she has had good weight loss of 33 + pounds\par -nutritional counseling was provided and she has made significant changes\par -discussed concept of calorie restriction and she is eating healthier\par -medical management including metformin (use off label), Qsymia, Contrave, Saxenda were discussed. Most medicines are cost limiting. We cannot do Contrave because of her history of seizures. She is already on topiramate for migraines and again would not add phentermine due to PE/heart issues. Metformin is an option but side effect is diarrhea and she suffers from Crohn's Disease. The only other potential option is GLP 1 agonist but this requires injections to abdominal wall region where she has chronic wounds. 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 GLP1 agonists not recommended. \par -patient is amenable to topamax for weight loss. Continue with topiramate\par \par Follow up with endocrine in5-6 months\par \par

## 2023-05-08 NOTE — REVIEW OF SYSTEMS
[Fatigue] : no fatigue [Decreased Appetite] : appetite not decreased [Recent Weight Loss (___ Lbs)] : recent weight loss: [unfilled] lbs [Dysphagia] : no dysphagia [Dysphonia] : no dysphonia [Chest Pain] : no chest pain [Slow Heart Rate] : heart rate is not slow [Palpitations] : no palpitations [Fast Heart Rate] : heart rate is not fast [Shortness Of Breath] : no shortness of breath [Cough] : no cough [Nausea] : no nausea [Constipation] : no constipation [Vomiting] : no vomiting [Diarrhea] : no diarrhea [Headaches] : no headaches [Depression] : no depression

## 2023-05-08 NOTE — HISTORY OF PRESENT ILLNESS
[FreeTextEntry1] : Patient is a 54 yo woman following up for weight management.\par \par Patient has a history of abdominal wall abscesses/complicated hernia, Crohn's Disease with weight struggles over a long period of time. Dr. Alex Reynoso wants to do hernia repairs but wants the patient to lose weight before proceeding. Patient had consultation with bariatric surgery who recommends bariatric surgery. Gastric sleeve was discussed. Over the last two years, during COVID, had significant weight gain. During COVID patient was drinking soda and reports it's "my drug of choice," ordering fast food and not moderating portions. Endocrine care was established in July 2022 at which point we discussed the need for improving dietary choices. Medical management was discussed. She is already on topiramate for migraines managed by neurology\par Patient walks her dog.\par Patient has eliminated soda. For her sweet cravings, she will have a small taste of juice or a smoothie. Occasional rum and coke but very minimal. Patient is eating more salads, greens and fruits. \par She has lost 33 lbs since the last visit\par Diet: mostly salads (made at home or purchased from [employer]); boiled eggs, egg whites, turkey almeida; more fruits\par Patient has cut back on her desserts and is substituting with fruit and reportedly healthier options

## 2023-06-12 ENCOUNTER — APPOINTMENT (OUTPATIENT)
Dept: DERMATOLOGY | Facility: CLINIC | Age: 53
End: 2023-06-12
Payer: MEDICARE

## 2023-06-12 PROCEDURE — 99213 OFFICE O/P EST LOW 20 MIN: CPT

## 2023-06-12 NOTE — HISTORY OF PRESENT ILLNESS
[FreeTextEntry1] : fu rosacea, furunculosis [de-identified] : LV feb\par using metrocream bid for rosacea - no breakouts \par groin grew mssa then gram negative colonizers\par no recent breakouts in thighs - using BP and clinda gel daily \par switching from skyrizi to remicade d/t hives w skyrizi \par might have hernia repair in august on abdomen

## 2023-06-12 NOTE — PHYSICAL EXAM
[Alert] : alert [Oriented x 3] : ~L oriented x 3 [Well Nourished] : well nourished [Conjunctiva Non-injected] : conjunctiva non-injected [No Visual Lymphadenopathy] : no visual  lymphadenopathy [No Clubbing] : no clubbing [No Edema] : no edema [No Bromhidrosis] : no bromhidrosis [No Chromhidrosis] : no chromhidrosis [FreeTextEntry3] : healing pink furuncle scar L breast\par no groin lesions, clear\par face clear

## 2023-08-07 ENCOUNTER — RESULT REVIEW (OUTPATIENT)
Age: 53
End: 2023-08-07

## 2023-08-07 ENCOUNTER — APPOINTMENT (OUTPATIENT)
Dept: COLORECTAL SURGERY | Facility: CLINIC | Age: 53
End: 2023-08-07
Payer: MEDICARE

## 2023-08-07 VITALS
TEMPERATURE: 97.9 F | BODY MASS INDEX: 33.66 KG/M2 | WEIGHT: 190 LBS | SYSTOLIC BLOOD PRESSURE: 101 MMHG | DIASTOLIC BLOOD PRESSURE: 69 MMHG | HEIGHT: 63 IN | HEART RATE: 105 BPM

## 2023-08-07 PROCEDURE — 99214 OFFICE O/P EST MOD 30 MIN: CPT | Mod: 25

## 2023-08-07 PROCEDURE — 10061 I&D ABSCESS COMP/MULTIPLE: CPT

## 2023-08-07 NOTE — ASSESSMENT
[FreeTextEntry1] : Multifocal abdominal wall abscess with secondary collection/infected mesh.  Incision and drainage performed today.  Recommend CT scan for further evaluation as to extent of collection.  Drain placed today.  Advised need for definitive surgery in the short-term future.  We will coordinate with general surgery–Edgardo

## 2023-08-07 NOTE — HISTORY OF PRESENT ILLNESS
[FreeTextEntry1] : 54 y/o F presents for follow up evaluation of Crohn's disease and pruritus ani   Patient initially diagnosed with Crohn's disease in , Initially diagnosed with Crohn's disease , was on Remicade q 4 weeks and Mercaptopurine, followed by BENJAMIN Cortez w/ plans to start Skyrizi (risankizumab-rzaa) 2022 PMH of AVM, seizures, aneurysm, desmoid tumor, PE 2017 PSH of surgery for AVM 1997, coil placement for aneurysm May 1997, LEEP procedure for dysplasia 2001, , perianal rectal and vaginal reconstructive surgery for fistula 2007, laparoscopic hernia repair 17, emergency colostomy creation 17 for perforated transverse colon and DVT, colostomy reversed approx , possible in office wound debridement near former ostomy site w/ Dr. Ricks (unknown year), IR drainage of anterior abdominal wall fluid 20 and 21, abdominal catheter for fluid collection 21.  Complex history in the setting of hernia repair with abdominal wall chronic fistula, patient initially seen by Dr. Reynoso 5/3/21 for discussion of hernia repair. Weight loss advised.  Seen 21 w/ anterior Anterior abdominal wall with IR drain in place. RLQ noted and on the keyanna-anal skin, a fistula in the 3:00 position and anterior anal verge-abscess. I&D performed.  Incidental right lung base nodular opacity noted on CT A/P by Dr. Reynoso and Dr. Chamberlain. She was seen by Dr. Christian 3/24/22, s/p CT chest 2021 and repeat in 3/2022.  CT chest on 21 - [id number] nodular opacity overlying the diaphragm in the RLL, previously measured [id number] on 21.  CT chest done on 3/7/22: - Since 2021, there has been a decrease in the size of a tubular opacity in the posterior right lower lobe, likely representing atelectasis or scar rather than a lung nodule.  Per Dr. Christian stable pulmonary nodule within subpleural RLL, recommended repeat CT in 1 year.  Seen by multiple providers in efforts for weight loss prior to discussion of abdominal wall surgery.  Seen in this office 22, recent episode of developing anterior thigh and medial thigh abscesses that required the use of topical antiabscess medication. The abscess spontaneously drained with resolution of the swelling. Purulent discharge without feculent material noted. Pain has resolved at this point. Symptoms occurred 3 weeks prior.  Recently saw primary GI . Colonoscopy performed. 2 polyps removed she reports. Was advised by primary GI that she will switch to Skirizi (risankizumab-rzaa). Evidence of prior punctate skin abscess along anterior and medial thigh. Pt recommended to use Hibiclens soap and recommended f/u with DERM  Seen by DERM Dr. Tripp on 22, advised to start benzoyl peroxide wash, started on topical clindamycin and tested for MRSA/MSSA carriage.   Seen by GEN SURG Dr. Reynoso on 22, encouraged on weight loss with physical activity, referral provided to dietician, recommend f/u in 3 months (Appt scheduled 3/3/23)  Seen by Bariatrics 10/19/22 and detailed nutrition plan discussed.  Pt seen in follow up 23, perianal skin excoriations and mild- moderate perianal excoriation (left lateral anal margin and right posterior evidence of quiescent anal fistula disease. No abscess. The sphincter tone was normal. There was no rectal tenderness present.  Of note, h/o chronic abdominal wound and hernia. Last seen by GEN SURG Edgardo on 3/3, advised to continue weight loss and f/u in 4 mo for CT scan and consideration of surgery combined w/ plastics and CRS. Pt has since lost 20 lbs and goal to lose another 2-30 lbs by end of summer for consideration of surgery w/ Next f/u with Dr. Reynoso 23. Pt requesting to see wound care nurse for consultation.  Most recent office visit 2023, Exam notable for mild to moderate perianal excoriations left lateral anal margin and right posterior - evidence of quiescent anal fistula disease, no abscess. Evidence of multiple superficial skin ulcerations involving left and right anal margin as well as posterior medial gluteal cleft. Pt recommended continue with barrier protective ointment and f/u results of cultures obtained today. Pt encouraged to seek repeat evaluation with dermatology. Furthermore, in regard to her chronic abdominal fistula pt to f/u with weight loss and planned consultation with Dr. Reynoso, general surgery.   Interim, pt seen by Derm Dr. Laya Tripp 23, Groin cultures (+) MSSA then gram negative colonizers, no recent breakouts in thighs. Using BP and Clindamycin gel daily. Switching from Skyrizi to Remicade d/t hive with Skyrizi  Patient reports 1 week of increasing drainage from the umbilicus with subsequent 3 days of increasing pain along the left mid abdomen and associated swelling.  Spontaneous drainage noted yesterday.

## 2023-08-07 NOTE — PHYSICAL EXAM
[de-identified] : Area of 2 draining sinuses at the umbilicus.  Area incised.  And drained.  Deep subcutaneous cavity identified with probe.  Drain in place.  Secondary area of erythema and induration along the left mid abdomen which was incised and drained.

## 2023-08-10 ENCOUNTER — NON-APPOINTMENT (OUTPATIENT)
Age: 53
End: 2023-08-10

## 2023-08-10 ENCOUNTER — APPOINTMENT (OUTPATIENT)
Dept: CT IMAGING | Facility: CLINIC | Age: 53
End: 2023-08-10
Payer: MEDICARE

## 2023-08-10 ENCOUNTER — OUTPATIENT (OUTPATIENT)
Dept: OUTPATIENT SERVICES | Facility: HOSPITAL | Age: 53
LOS: 1 days | End: 2023-08-10

## 2023-08-10 PROCEDURE — 74176 CT ABD & PELVIS W/O CONTRAST: CPT | Mod: 26,MH

## 2023-08-24 ENCOUNTER — APPOINTMENT (OUTPATIENT)
Dept: CT IMAGING | Facility: HOSPITAL | Age: 53
End: 2023-08-24

## 2023-08-24 ENCOUNTER — OUTPATIENT (OUTPATIENT)
Dept: OUTPATIENT SERVICES | Facility: HOSPITAL | Age: 53
LOS: 1 days | End: 2023-08-24
Payer: MEDICARE

## 2023-08-24 PROCEDURE — 71250 CT THORAX DX C-: CPT

## 2023-08-24 PROCEDURE — 71250 CT THORAX DX C-: CPT | Mod: 26,MH

## 2023-08-25 ENCOUNTER — APPOINTMENT (OUTPATIENT)
Dept: SURGERY | Facility: CLINIC | Age: 53
End: 2023-08-25
Payer: MEDICARE

## 2023-08-25 VITALS
DIASTOLIC BLOOD PRESSURE: 84 MMHG | TEMPERATURE: 97.2 F | HEIGHT: 63 IN | BODY MASS INDEX: 34.24 KG/M2 | HEART RATE: 79 BPM | OXYGEN SATURATION: 98 % | SYSTOLIC BLOOD PRESSURE: 131 MMHG | WEIGHT: 193.25 LBS

## 2023-08-25 PROCEDURE — 99214 OFFICE O/P EST MOD 30 MIN: CPT

## 2023-08-25 NOTE — DATA REVIEWED
[FreeTextEntry1] : CT scan abdomen/pelvis (8/10/23)- Discussed and reviewed  IPRESSION: 1. Impacted stool-filled loop of colon at the level of the suture line, compatible with a large bezoar. These findings were conveyed to Dr. Emeterio Echeverria of colorectal surgery at the time of this dictation. 2. Stable large left lower quadrant hernia without evidence of obstruction

## 2023-08-25 NOTE — HISTORY OF PRESENT ILLNESS
[de-identified] : This is a 50 y/o female with a complex abdominal surgical history presents to for evaluation and management of  abdominal wall abscesses/hernia. Reports left groin pain, mid abdominal pain and abdominal drainage started in 2020. She currently is being followed by her general surgeon, . Has a drainage catheter for chronic abdominal abscesses, placed in 2021. Last CT scan in . \par  \par  Initially diagnosed with Crohn's disease , currently on Remicade q 4 weeks and Mercaptopurine, followed by BENJAMIN Cortez\par  PMH of AVM, seizures, aneurysm, desmoid tumor, PE 2017\par  \par  PSH of surgery for AVM 1997, coil placement for aneurysm May 1997, LEEP procedure for dysplasia 2001, , perianal rectal and vaginal reconstructive surgery for fistula 2007, laparoscopic hernia repair 17, emergency colostomy creation 17 for perforated transverse colon and DVT, colostomy reversed approx , possible in office wound debridement near former ostomy site w/ Dr. Ricks (unknown year), IR drainage of anterior abdominal wall fluid 20 and 21, abdominal catheter for fluid collection 21.\par  \par  Pt presents today to discuss abdominal abscesses, she continues to be followed by Gen surgeon Dr. Ricks and has a f/u soon.\par  \par  Reports symptoms began in December w/ mid abdominal pain and LLQ pelvic pain. Denies hx of fevers, nausea or vomiting.\par  \par  Has been followed by Dr. Ricks who ordered CT imaging and referred to IR for drainage.\par  Pt was previously on Augmentin x 14 days, then switched to Levaquin in January x 4-6 weeks, however stopped medication last month as per Ortho/Gen Surgeon due to knee pain from prolonged use\par  \par  Pt has been having approximately monthly imaging included CT or abd US typically at [facility] and occasionally at [facility]. - Does not believe she has had an actual CT since \par  Drain continues to have brown tinged clear discharge\par  Pt reports slightly soreness at former colostomy bag which has occurred intermittently over the last couple of years.\par  Denies fever, n/v/c/d\par  Denies passing flatus or stool via vagina or urethra, denies urine via anus [de-identified] : 4-: Returns to office. Overall about the same. Has had significant trouble losing weight. Remains with BMI of ~40 (5'3"  222lbs). Does have some persistent evidence of fistula vs foreign body infection of abdominal wall that has been managed by local wound care. Wishes to re-engage regarding abdominal wall.  6- - Returns to office today. Continues to have difficulty with weight management. Saw Dr. Winslow, no bariatric surgery was offered. Discussed with her today to consider discussion with endocrinology regarding newer medical weight loss options. Overall abdominal wall remains the same.   9-: Returns to office. She reports she is overall doing well today. She states she is continuing to struggle with weight loss, however she reports she has just joined a gym and is excited to get back into working out. She has seen dermatology for a recent episode of thigh abscess which spontaneously drained. Reports area is healing well with no pain or discomfort. She report she saw  x 1 month ago for discomfort at ostomy site and was prescribed antibiotics for possible infection. Reports she finished full course of antibiotics and pain has subsided. Continuing to see her GI doctor for management of crohns, she is switching medication of skyrizi. She denies any abdominal pain or discomfort. Denies any change in size of hernia. Continuing to change dressing daily.   3-3-23: Returns to office. Overall doing well. She has done great with weight loss and reports she is overall feeling well. Has been working with dietician. She denies any abdominal discomfort or pain.   8-25-23: Seen today in office. She reports she continues to have abdominal wall drainage with an increase in drainage x 3 weeks ago. She saw  for this and is s/p I&D with  on 8/7. She underwent a CT scan of the abdomen/pelvis on 8/10 which revealed a large bezoar and stable large left lower quadrant hernia with no evidence of obstruction. She denies any change in size of hernia. She denies significant pain, however, does have occasional discomfort. She denies any fever,n/v/d/c.

## 2023-08-25 NOTE — PHYSICAL EXAM
[Normal Breath Sounds] : Normal breath sounds [Normal Heart Sounds] : normal heart sounds [Normal Rate and Rhythm] : normal rate and rhythm [Alert] : alert [Oriented to Person] : oriented to person [Oriented to Place] : oriented to place [Oriented to Time] : oriented to time [Calm] : calm [Abdominal Masses] : No abdominal masses [Abdomen Tenderness] : ~T ~M No abdominal tenderness [Tender] : was nontender [Enlarged] : not enlarged [Purpura] : no purpura  [de-identified] : NAD, comfortable [de-identified] : Normocephalic, atraumatic. No scleral icterus.  [de-identified] : Supple, no JVD or cervical lymphadenopathy.  [de-identified] : No respiratory distress.  [de-identified] : +BS soft, nondistended. Abdominal tenderness. Multiple healed incisions. Purulent fistula present, dressing in place. Incisional , reducible, notnender.

## 2023-08-25 NOTE — PLAN
[FreeTextEntry1] : Fanny APARICIO PA-C, am scribing for and the presence of Dr.Robert Reynoso the following sections HISTORY OF PRESENT ILLNESSS, PAST MEDICAL/FAMILY/SOCIAL HISTORY; REVIEW OF SYSTEMS; VITAL SIGNS; PHYSICAL EXAM; DISPOSITION.

## 2023-08-30 PROBLEM — R91.1 LUNG NODULE: Status: ACTIVE | Noted: 2021-11-22

## 2023-08-31 ENCOUNTER — APPOINTMENT (OUTPATIENT)
Dept: THORACIC SURGERY | Facility: CLINIC | Age: 53
End: 2023-08-31
Payer: MEDICARE

## 2023-08-31 VITALS
TEMPERATURE: 97.3 F | DIASTOLIC BLOOD PRESSURE: 71 MMHG | BODY MASS INDEX: 34.2 KG/M2 | HEIGHT: 63 IN | RESPIRATION RATE: 18 BRPM | SYSTOLIC BLOOD PRESSURE: 127 MMHG | HEART RATE: 97 BPM | OXYGEN SATURATION: 97 % | WEIGHT: 193 LBS

## 2023-08-31 DIAGNOSIS — R91.1 SOLITARY PULMONARY NODULE: ICD-10-CM

## 2023-08-31 PROCEDURE — 99213 OFFICE O/P EST LOW 20 MIN: CPT

## 2023-08-31 NOTE — ASSESSMENT
[FreeTextEntry1] : Patient is a 53 year old female, never smoker, history of an abdominal desmoid tumor, no family history of lung cancer, no environmental exposures, who has been followed for a 1.1 cm subpleural right lower lobe nodule. She returns to discuss her year CT scan.   CT scan was reviewed. The nodule is stable over time and has not grown. The patient also is low risk for developing a lung cancer.  I think continued surveillance is reasonable. Patient agrees and understands. Will repeat a CT scan in 1 year.   PLAN 1.  CT in 1 year

## 2023-08-31 NOTE — PHYSICAL EXAM
[] : no respiratory distress [Auscultation Breath Sounds / Voice Sounds] : lungs were clear to auscultation bilaterally [Examination Of The Chest] : the chest was normal in appearance [Chest Visual Inspection Thoracic Asymmetry] : no chest asymmetry [Diminished Respiratory Excursion] : normal chest expansion [Abnormal Walk] : normal gait [Nail Clubbing] : no clubbing  or cyanosis of the fingernails [Musculoskeletal - Swelling] : no joint swelling seen [Motor Tone] : muscle strength and tone were normal [Oriented To Time, Place, And Person] : oriented to person, place, and time [Impaired Insight] : insight and judgment were intact [Affect] : the affect was normal

## 2023-08-31 NOTE — HISTORY OF PRESENT ILLNESS
[FreeTextEntry1] : 53 year old female, never smoker, with a PMHx of Crohn's disease ( on Remicade q 4 weeks and Mercaptopurine), AVM, seizures, aneurysm, desmoid tumor, PE 2017, LEEP procedure for dysplasia 2001, , perianal rectal and vaginal reconstructive surgery for fistula 2007, laparoscopic hernia repair 17, emergency colostomy creation 17 for perforated transverse colon, and DVT, colostomy reversed approx , possible in office wound debridement near former ostomy site w/ Dr. Ricks (unknown year), IR drainage of anterior abdominal wall fluid 20 and 21, abdominal catheter for chronic abdominal abscess 21. Has plans to undergo hernia repair and possible small bowel resection with Dr. Echeverria.   She was initially referred by Dr. Reynoso to Dr. Chamberlain in May 2021 for right lung base nodular opacity, incidentally found on CT abd and pelvis.  CT chest on 3/7/22 revealed decrease in size of tubular opacity in the posterior basal segment of right lower lobe in posterior costophrenic sulcus from 15 x 8 mm to 11 x 7 mm.  Patient presents today for a follow up visit to review her recent chest CT scan. Report as below:  CT chest on 23: -Since 2022; Unchanged irregular right basilar subpleural 1.1 cm nodule. Continued surveillance recommended.  -No new or enlarging pulmonary nodules

## 2023-09-08 ENCOUNTER — NON-APPOINTMENT (OUTPATIENT)
Age: 53
End: 2023-09-08

## 2023-09-08 ENCOUNTER — APPOINTMENT (OUTPATIENT)
Dept: COLORECTAL SURGERY | Facility: CLINIC | Age: 53
End: 2023-09-08
Payer: MEDICARE

## 2023-09-08 VITALS
HEIGHT: 63 IN | HEART RATE: 130 BPM | BODY MASS INDEX: 33.66 KG/M2 | DIASTOLIC BLOOD PRESSURE: 87 MMHG | SYSTOLIC BLOOD PRESSURE: 120 MMHG | TEMPERATURE: 98 F | WEIGHT: 190 LBS

## 2023-09-08 PROCEDURE — 99214 OFFICE O/P EST MOD 30 MIN: CPT

## 2023-09-08 NOTE — PHYSICAL EXAM
[de-identified] : Medial umbilical region with draining fistula/tracks and drain in place.  Significant purulent drainage on the skin.  Minimal induration.  Mild erythema/contact associated.  Secondary areas of sinus tracts noted along the left mid abdomen.

## 2023-09-08 NOTE — HISTORY OF PRESENT ILLNESS
[FreeTextEntry1] : 52 y/o F presents for follow up evaluation of Crohn's disease and pruritus ani   Patient initially diagnosed with Crohn's disease in , Initially diagnosed with Crohn's disease , was on Remicade q 4 weeks and Mercaptopurine, followed by BENJAMIN Cortez w/ plans to start Skyrizi (risankizumab-rzaa) 2022  PMH of AVM, seizures, aneurysm, desmoid tumor, PE 2017 PSH of surgery for AVM 1997, coil placement for aneurysm May 1997, LEEP procedure for dysplasia 2001, , perianal rectal and vaginal reconstructive surgery for fistula 2007, laparoscopic hernia repair 17, emergency colostomy creation 17 for perforated transverse colon and DVT, colostomy reversed approx , possible in office wound debridement near former ostomy site w/ Dr. Ricks (unknown year), IR drainage of anterior abdominal wall fluid 20 and 21, abdominal catheter for fluid collection 21.  Complex history in the setting of hernia repair with abdominal wall chronic fistula, patient initially seen by Dr. Reynoso 5/3/21 for discussion of hernia repair. Weight loss advised.  Seen 21 w/ anterior Anterior abdominal wall with IR drain in place. RLQ noted and on the keyanna-anal skin, a fistula in the 3:00 position and anterior anal verge-abscess. I&D performed.  Incidental right lung base nodular opacity noted on CT A/P by Dr. Reynoso and Dr. Chamberlain. She was seen by Dr. Christian 3/24/22, s/p CT chest 2021 and repeat in 3/2022.  CT chest on 21 - [id number] nodular opacity overlying the diaphragm in the RLL, previously measured [id number] on 21.  CT chest done on 3/7/22: - Since 2021, there has been a decrease in the size of a tubular opacity in the posterior right lower lobe, likely representing atelectasis or scar rather than a lung nodule.  Per Dr. Christian stable pulmonary nodule within subpleural RLL, recommended repeat CT in 1 year.  Seen by multiple providers in efforts for weight loss prior to discussion of abdominal wall surgery.  Seen in this office 22, recent episode of developing anterior thigh and medial thigh abscesses that required the use of topical antiabscess medication. The abscess spontaneously drained with resolution of the swelling. Purulent discharge without feculent material noted. Pain has resolved at this point. Symptoms occurred 3 weeks prior.  Recently saw primary GI . Colonoscopy performed. 2 polyps removed she reports. Was advised by primary GI that she will switch to Skirizi (risankizumab-rzaa). Evidence of prior punctate skin abscess along anterior and medial thigh. Pt recommended to use Hibiclens soap and recommended f/u with DERM  Seen by DERM Dr. Tripp on 22, advised to start benzoyl peroxide wash, started on topical clindamycin and tested for MRSA/MSSA carriage.  Seen by GEN SURG Dr. Reynoso on 22, encouraged on weight loss with physical activity, referral provided to dietician, recommend f/u in 3 months (Appt scheduled 3/3/23)  Seen by Bariatrics 10/19/22 and detailed nutrition plan discussed.  Seen in follow up 23, perianal skin excoriations and mild- moderate perianal excoriation (left lateral anal margin and right posterior evidence of quiescent anal fistula disease. No abscess. The sphincter tone was normal. There was no rectal tenderness present.  Of note, h/o chronic abdominal wound and hernia. Last seen by GEN SURG Edgardo on 3/3, advised to continue weight loss and f/u in 4 mo for CT scan and consideration of surgery combined w/ plastics and CRS. Pt has since lost 20 lbs and goal to lose another 2-30 lbs by end of summer for consideration of surgery w/ Next f/u with Dr. Reynoso 23. Pt requesting to see wound care nurse for consultation.  office visit 2023, Exam notable for mild to moderate perianal excoriations left lateral anal margin and right posterior - evidence of quiescent anal fistula disease, no abscess. Evidence of multiple superficial skin ulcerations involving left and right anal margin as well as posterior medial gluteal cleft. Pt recommended continue with barrier protective ointment and f/u results of cultures obtained today. Pt encouraged to seek repeat evaluation with dermatology. Furthermore, in regard to her chronic abdominal fistula pt to f/u with weight loss and planned consultation with Dr. Reynoso, general surgery.  Interim, pt seen by Jyothi Tripp 23, Groin cultures (+) MSSA then gram negative colonizers, no recent breakouts in thighs. Using BP and Clindamycin gel daily. Switching from Skyrizi to Remicade d/t hive with Skyrizi  Most recent office visit 23, pt reports 1 week of increasing drainage from umbilicus with subsequent 3 days of increasing pain along the left mid abdomen associated swelling. Spontaneous drainage noted yesterday.   Exam notable for, area of 2 draining sinuses at the umbilicus. S/p I&D deep subcutaneous cavity identified with probe. Drain in place. Secondary area of erythema and induration along the left mid abdomen. S/p I&D.   Multifocal abdominal wall abscess with secondary collection/infected mesh. Incision and drainage performed today.  Recommend CT scan for further evaluation as to extent of collection. Drain placed today.  CT A/P performed 8/10/23 Impression:  1.  Impacted stool-filled loop of colon at the level of the suture line, compatible with a large bezoar. These findings were conveyed to Dr. Emeterio Echeverria of colorectal surgery at the time of this dictation. 2.  Stable large left lower quadrant hernia without evidence of obstruction  CT chest ordered for surveillance of pulmonary nodule on 23 Impression:  Since 2022; Unchanged irregular right basilar subpleural 1.1 cm nodule. Continued surveillance recommended. No new or enlarging pulmonary nodules  Interim, patient seen by Dr. Reynoso 2523, discussed possible treatment options including incisional hernia repair with possible mesh, abdominal wall reconstruction, bowel resection and enterocutaneous fistula takedown. Patient expressed understanding and wishes to proceed with surgery. Discussed will coordinate with Dr. Echeverria for combined surgery.   Pt reports continued drainage from abdominal wall, mostly clear to yellow discharge from drain. Skin opening to left of drain mostly expresses blood and some pus with manual pressure. Quantity of discharge requires changing gauze, tegaderm, ADB padding daily.  Noticed a new opening lower left three days ago and has been discharging some blood and pus. ALso has two small bumps on mid to upper abdomen that are slightly painful and looks like beginning of new infection. She continues Remicade month, due for infusion at [facility] on .  Denies fever or chills, denies n/v Has BM 1-2 times daily, no blood or mucus in stool.

## 2023-09-08 NOTE — ASSESSMENT
[FreeTextEntry1] : Complex Crohn's disease with suspected colocutaneous/fistula/abscess.  Recent bezoar noted on CT scan.  Recommend repeat CT scan for further assessment.  Advised role of surgical treatment.  She wishes to pursue surgery.  We will plan for takedown of this fistula and potential bowel resection with possible need for ostomy/fecal diversion.  In addition we will plan for hernia repair with general surgery.  Risk, benefits and alternatives of surgery outlined including but limited to risk of bleeding, infection, recurrent hernia, recurrent abscess/fistula and need for future procedures.  All questions answered

## 2023-09-11 ENCOUNTER — RESULT REVIEW (OUTPATIENT)
Age: 53
End: 2023-09-11

## 2023-09-20 ENCOUNTER — APPOINTMENT (OUTPATIENT)
Dept: CT IMAGING | Facility: CLINIC | Age: 53
End: 2023-09-20
Payer: MEDICARE

## 2023-09-20 ENCOUNTER — OUTPATIENT (OUTPATIENT)
Dept: OUTPATIENT SERVICES | Facility: HOSPITAL | Age: 53
LOS: 1 days | End: 2023-09-20

## 2023-09-20 PROCEDURE — 74176 CT ABD & PELVIS W/O CONTRAST: CPT | Mod: 26,MH

## 2023-10-02 ENCOUNTER — APPOINTMENT (OUTPATIENT)
Dept: COLORECTAL SURGERY | Facility: CLINIC | Age: 53
End: 2023-10-02
Payer: MEDICARE

## 2023-10-02 VITALS
SYSTOLIC BLOOD PRESSURE: 124 MMHG | TEMPERATURE: 97.7 F | HEIGHT: 63 IN | BODY MASS INDEX: 34.38 KG/M2 | DIASTOLIC BLOOD PRESSURE: 87 MMHG | HEART RATE: 90 BPM | WEIGHT: 194 LBS

## 2023-10-02 PROCEDURE — 99214 OFFICE O/P EST MOD 30 MIN: CPT

## 2023-11-13 ENCOUNTER — APPOINTMENT (OUTPATIENT)
Dept: COLORECTAL SURGERY | Facility: CLINIC | Age: 53
End: 2023-11-13
Payer: MEDICARE

## 2023-11-13 ENCOUNTER — APPOINTMENT (OUTPATIENT)
Dept: ENDOCRINOLOGY | Facility: CLINIC | Age: 53
End: 2023-11-13
Payer: MEDICARE

## 2023-11-13 VITALS
BODY MASS INDEX: 33.31 KG/M2 | TEMPERATURE: 97.7 F | SYSTOLIC BLOOD PRESSURE: 114 MMHG | DIASTOLIC BLOOD PRESSURE: 80 MMHG | WEIGHT: 188 LBS | HEART RATE: 90 BPM | HEIGHT: 63 IN

## 2023-11-13 VITALS
WEIGHT: 188 LBS | HEIGHT: 63 IN | OXYGEN SATURATION: 97 % | DIASTOLIC BLOOD PRESSURE: 91 MMHG | SYSTOLIC BLOOD PRESSURE: 133 MMHG | HEART RATE: 105 BPM | BODY MASS INDEX: 33.31 KG/M2

## 2023-11-13 DIAGNOSIS — E66.09 OTHER OBESITY DUE TO EXCESS CALORIES: ICD-10-CM

## 2023-11-13 PROCEDURE — 99214 OFFICE O/P EST MOD 30 MIN: CPT

## 2023-11-13 RX ORDER — AMOXICILLIN 875 MG/1
875 TABLET, FILM COATED ORAL
Qty: 14 | Refills: 0 | Status: DISCONTINUED | COMMUNITY
Start: 2023-04-06 | End: 2023-11-13

## 2023-11-28 ENCOUNTER — APPOINTMENT (OUTPATIENT)
Dept: COLORECTAL SURGERY | Facility: CLINIC | Age: 53
End: 2023-11-28
Payer: MEDICARE

## 2023-11-28 VITALS
RESPIRATION RATE: 14 BRPM | HEART RATE: 103 BPM | HEIGHT: 62 IN | OXYGEN SATURATION: 97 % | DIASTOLIC BLOOD PRESSURE: 105 MMHG | SYSTOLIC BLOOD PRESSURE: 145 MMHG | WEIGHT: 188 LBS | BODY MASS INDEX: 34.6 KG/M2

## 2023-11-28 PROCEDURE — 99213 OFFICE O/P EST LOW 20 MIN: CPT | Mod: 25

## 2023-11-28 PROCEDURE — 10060 I&D ABSCESS SIMPLE/SINGLE: CPT

## 2024-01-19 ENCOUNTER — APPOINTMENT (OUTPATIENT)
Dept: COLORECTAL SURGERY | Facility: CLINIC | Age: 54
End: 2024-01-19
Payer: MEDICARE

## 2024-01-19 VITALS
WEIGHT: 187 LBS | BODY MASS INDEX: 34.41 KG/M2 | HEART RATE: 98 BPM | TEMPERATURE: 97.7 F | HEIGHT: 62 IN | SYSTOLIC BLOOD PRESSURE: 128 MMHG | DIASTOLIC BLOOD PRESSURE: 83 MMHG

## 2024-01-19 PROCEDURE — 99214 OFFICE O/P EST MOD 30 MIN: CPT

## 2024-01-19 NOTE — HISTORY OF PRESENT ILLNESS
[FreeTextEntry1] : 54 y/o F presents for f/u evaluation of Crohn's disease   Patient initially diagnosed with Crohn's disease in , Initially diagnosed with Crohn's disease , was on Remicade q 4 weeks and Mercaptopurine, followed by BENJAMIN Cortez w/ plans to start Skyrizi (risankizumab-rzaa) 2022  PMH of AVM, seizures, aneurysm, desmoid tumor, PE 2017 PSH of surgery for AVM 1997, coil placement for aneurysm May 1997, LEEP procedure for dysplasia 2001, , perianal rectal and vaginal reconstructive surgery for fistula 2007, laparoscopic hernia repair 17, emergency colostomy creation 17 for perforated transverse colon and DVT, colostomy reversed approx , possible in office wound debridement near former ostomy site w/ Dr. Ricks (unknown year), IR drainage of anterior abdominal wall fluid 20 and 21, abdominal catheter for fluid collection 21.  Complex history in the setting of hernia repair with abdominal wall chronic fistula, patient initially seen by Dr. Reynoso 5/3/21 for discussion of hernia repair. Weight loss advised.  Seen 21 w/ anterior Anterior abdominal wall with IR drain in place. RLQ noted and on the keyanna-anal skin, a fistula in the 3:00 position and anterior anal verge-abscess. I&D performed.  Incidental right lung base nodular opacity noted on CT A/P by Dr. Reynoso and Dr. Chamberlain. She was seen by Dr. Christian 3/24/22, s/p CT chest 2021 and repeat in 3/2022.  CT chest on 21 - [id number] nodular opacity overlying the diaphragm in the RLL, previously measured [id number] on 21.  CT chest done on 3/7/22: - Since 2021, there has been a decrease in the size of a tubular opacity in the posterior right lower lobe, likely representing atelectasis or scar rather than a lung nodule.  Per Dr. Christian stable pulmonary nodule within subpleural RLL, recommended repeat CT in 1 year.  Seen by multiple providers in efforts for weight loss prior to discussion of abdominal wall surgery.  Seen in this office 22, recent episode of developing anterior thigh and medial thigh abscesses that required the use of topical antiabscess medication. The abscess spontaneously drained with resolution of the swelling. Purulent discharge without feculent material noted. Pain has resolved at this point. Symptoms occurred 3 weeks prior.  Saw primary GI . Colonoscopy performed. 2 polyps removed she reports. Was advised by primary GI that she will switch to Skirizi (risankizumab-rzaa). Evidence of prior punctate skin abscess along anterior and medial thigh. Pt recommended to use Hibiclens soap and recommended f/u with DERM  Seen by DERM Dr. Tripp on 22, advised to start benzoyl peroxide wash, started on topical clindamycin and tested for MRSA/MSSA carriage.  Seen by GEN SURG Dr. Reynoso on 22, encouraged on weight loss with physical activity, referral provided to dietician, recommend f/u in 3 months (Appt scheduled 3/3/23)  Seen by Bariatrics 10/19/22 and detailed nutrition plan discussed.  Seen in follow up 23, perianal skin excoriations and mild- moderate perianal excoriation (left lateral anal margin and right posterior evidence of quiescent anal fistula disease. No abscess. The sphincter tone was normal. There was no rectal tenderness present.  Of note, h/o chronic abdominal wound and hernia. Last seen by GEN SURG Edgardo on 3/3/2023, advised to continue weight loss and f/u in 4 mo for CT scan and consideration of surgery combined w/ plastics and CRS. Pt has since lost 20 lbs and goal to lose another 2-30 lbs by end of summer for consideration of surgery w/ Next f/u with Dr. eRynoso 23. Pt requesting to see wound care nurse for consultation.  office visit 2023, Exam notable for mild to moderate perianal excoriations left lateral anal margin and right posterior - evidence of quiescent anal fistula disease, no abscess. Evidence of multiple superficial skin ulcerations involving left and right anal margin as well as posterior medial gluteal cleft. Pt recommended continue with barrier protective ointment and f/u results of cultures obtained today. Pt encouraged to seek repeat evaluation with dermatology. Furthermore, in regard to her chronic abdominal fistula pt to f/u with weight loss and planned consultation with Dr. Reynoso, general surgery.  Interim, pt seen by Derm Dr. Laya Tripp 23, Groin cultures (+) MSSA then gram negative colonizers, no recent breakouts in thighs. Using BP and Clindamycin gel daily. Switching from Skyrizi to Remicade d/t hive with Skyrizi  Seen 23 for f/u: Exam notable for, area of 2 draining sinuses at the umbilicus. S/p I&D deep subcutaneous cavity identified with probe. Drain in place. Secondary area of erythema and induration along the left mid abdomen. Recommend CT scan for further evaluation as to extent of collection. Drain placed today.  CT A/P performed 8/10/23 Impression: 1. Impacted stool-filled loop of colon at the level of the suture line, compatible with a large bezoar. These findings were conveyed to Dr. Emeterio Echeverria of colorectal surgery at the time of this dictation. 2. Stable large left lower quadrant hernia without evidence of obstruction  CT chest ordered for surveillance of pulmonary nodule on 23 Impression: Since 2022; Unchanged irregular right basilar subpleural 1.1 cm nodule. Continued surveillance recommended. No new or enlarging pulmonary nodules  Seen by Dr. Reynoso 23, discussed possible treatment options including incisional hernia repair with possible mesh, abdominal wall reconstruction, bowel resection and enterocutaneous fistula takedown. Patient expressed understanding and wishes to proceed with surgery. Discussed will coordinate with Dr. Echeverria for combined surgery.   Last seen 2023 due to continued abdominal wall drainage of pus and blood, on exam Gastrointestinal: Medial umbilical region with draining fistula/tracks and drain in place. Significant purulent drainage on the skin. Minimal induration. Mild erythema/contact associated. Secondary areas of sinus tracts noted along the left mid abdomen. Suspected colocutaneous/fistula/abscess. Recent bezoar noted on CT scan.  Recommend repeat CT scan for further assessment. Surgery was discussed with patient which she agreed to do. Plan is to takedown of this fistula and potential bowel resection with possible need for ostomy/fecal diversion. In addition plan for hernia repair with general surgery.  CT scan of Abdomen and Pelvis 2023: Stable midline anterior pelvic wall surgical defect without clear fistulous connection between the adjacent small bowel.  Seen for f/u 10/02/2023, reporting decreased pain but persistent drainage at the umbilicus/seton placed previously. Currently on Rinvoq for management of her Crohn's. On exam Medial umbilical region with draining fistula/tracks and drain in place. Significant purulent drainage on the skin. Minimal induration. Secondary area along the left mid abdomen of previous sinus tract closed. No erythema or induration. Nontender. Recommended to continue Crohn's management with GI. Repeat assessment in 6-8 weeks. If persistent drainage noted we will consider definitive surgery for bowel resection and abdominal hernia repair.  Patient seen 2023 for f/u, exam noted Medial umbilical region with draining fistula/tracks and drain in place. Significant purulent drainage on the skin. Drain in place. Small area along the left mid abdomen with 2 small punctate superficial pustules. No undermining fistula tract identified.   Patient wishes to continue with trial of medical management. Advised follow-up in 2 months. Advised follow-up promptly sooner if she develops symptoms of recurrent abdominal pain or secondary signs of infection.    Seen by Dr. Manuel 23 due to complaint of left flank wall abscess. Exam noted Reducible LLQ ventral hernia. Midline wound with penrose seton draining purulence with significant surrounding denuded skin. Left abdominal flank with an inflammatory fluctuant mass measuring roughly 5x4cm without drainage.  S/p I&D. advised to f/u Dr. Echeverria as scheduled.   Patient presents for f/u today: As per patient GI d/c Rinvoq due to patient having persistent fistulas and abscesses which are SE of the medication. Was started or Remicade 2023, due for next tx 24 and Mercaptopurine. States its too soon to tell if she notices a difference in her symptoms with change in medication.   Patient reports after I&D of wall abscess in November has seen some small pustules here and there that patient has expressed on her own, noted bloody discharge. Continues to report to have drainage around umbilicus, reports some soreness but no pain, fever or chills.

## 2024-01-19 NOTE — PHYSICAL EXAM
[de-identified] : Medial umbilical region with draining fistula/tracks and drain in place.  Significant purulent drainage on the skin.

## 2024-01-19 NOTE — ASSESSMENT
[FreeTextEntry1] : I reviewed with the patient that her findings on examination are consistent with a consolidation of her fistula tract.  Recommends repeat CT scan for further assessment.  I anticipate that we will move forward with plans for surgery in the near future to address her underlying fistula and abdominal wall defect.  Recommend return to general surgery/hernia team for evaluation.  Patient request surgery in May secondary to current living situation.  We will see her once before surgery for final surgical planning discussion.

## 2024-01-22 ENCOUNTER — RESULT REVIEW (OUTPATIENT)
Age: 54
End: 2024-01-22

## 2024-01-22 ENCOUNTER — APPOINTMENT (OUTPATIENT)
Dept: DERMATOLOGY | Facility: CLINIC | Age: 54
End: 2024-01-22
Payer: MEDICARE

## 2024-01-22 DIAGNOSIS — Z92.25 PERSONAL HISTORY OF IMMUNOSUPRESSION THERAPY: ICD-10-CM

## 2024-01-22 DIAGNOSIS — Z12.83 ENCOUNTER FOR SCREENING FOR MALIGNANT NEOPLASM OF SKIN: ICD-10-CM

## 2024-01-22 DIAGNOSIS — L25.8 UNSPECIFIED CONTACT DERMATITIS DUE TO OTHER AGENTS: ICD-10-CM

## 2024-01-22 PROCEDURE — 99214 OFFICE O/P EST MOD 30 MIN: CPT

## 2024-01-22 RX ORDER — MOMETASONE FUROATE 1 MG/G
0.1 CREAM TOPICAL
Qty: 1 | Refills: 2 | Status: ACTIVE | COMMUNITY
Start: 2024-01-22 | End: 1900-01-01

## 2024-01-22 RX ORDER — CLINDAMYCIN PHOSPHATE 10 MG/ML
1 LOTION TOPICAL
Qty: 1 | Refills: 11 | Status: COMPLETED | COMMUNITY
Start: 2023-02-15 | End: 2024-01-22

## 2024-01-22 RX ORDER — BENZOYL PEROXIDE 100 MG/ML
10 LIQUID TOPICAL
Qty: 1 | Refills: 11 | Status: ACTIVE | COMMUNITY
Start: 2022-09-22 | End: 1900-01-01

## 2024-01-22 NOTE — HISTORY OF PRESENT ILLNESS
[FreeTextEntry1] : moles [de-identified] : LV June for furuncles, rosacea back on Remicade for IBD no hx skin cancer  irritation under breasts also irritation under bandage from abdominal fistula

## 2024-01-22 NOTE — PHYSICAL EXAM
[Alert] : alert [Oriented x 3] : ~L oriented x 3 [Well Nourished] : well nourished [Conjunctiva Non-injected] : conjunctiva non-injected [No Visual Lymphadenopathy] : no visual  lymphadenopathy [No Clubbing] : no clubbing [No Edema] : no edema [No Bromhidrosis] : no bromhidrosis [No Chromhidrosis] : no chromhidrosis [Full Body Skin Exam Performed] : performed [FreeTextEntry3] : large bandage lower abdomen w eczematous plaque surrounding  erythema inframammary inflammatory papules/nodules medial thighs

## 2024-02-08 ENCOUNTER — OUTPATIENT (OUTPATIENT)
Dept: OUTPATIENT SERVICES | Facility: HOSPITAL | Age: 54
LOS: 1 days | End: 2024-02-08

## 2024-02-08 ENCOUNTER — APPOINTMENT (OUTPATIENT)
Dept: CT IMAGING | Facility: CLINIC | Age: 54
End: 2024-02-08
Payer: MEDICARE

## 2024-02-08 PROCEDURE — 74177 CT ABD & PELVIS W/CONTRAST: CPT | Mod: 26,MH

## 2024-03-01 ENCOUNTER — APPOINTMENT (OUTPATIENT)
Dept: SURGERY | Facility: CLINIC | Age: 54
End: 2024-03-01
Payer: MEDICARE

## 2024-03-01 VITALS
TEMPERATURE: 98.4 F | OXYGEN SATURATION: 98 % | HEART RATE: 93 BPM | HEIGHT: 62 IN | DIASTOLIC BLOOD PRESSURE: 80 MMHG | WEIGHT: 184 LBS | SYSTOLIC BLOOD PRESSURE: 114 MMHG | BODY MASS INDEX: 33.86 KG/M2

## 2024-03-01 PROCEDURE — 99214 OFFICE O/P EST MOD 30 MIN: CPT

## 2024-04-04 NOTE — DATA REVIEWED
[FreeTextEntry1] :  CT ABDOMEN AND PELVIS 2/8/24  IMPRESSION: Persistent long segment of abnormal small bowel adherent to the anterior abdominal wall at the superior margin of a persistent large infraumbilical ventral abdominal hernia with a fistulous tract extending to the skin of the periumbilical region; findings are consistent with chronic Crohn's enteritis.

## 2024-04-04 NOTE — PHYSICAL EXAM
[Normal Breath Sounds] : Normal breath sounds [Normal Heart Sounds] : normal heart sounds [Normal Rate and Rhythm] : normal rate and rhythm [Alert] : alert [Oriented to Person] : oriented to person [Oriented to Place] : oriented to place [Oriented to Time] : oriented to time [Calm] : calm [Abdominal Masses] : No abdominal masses [Abdomen Tenderness] : ~T ~M No abdominal tenderness [Tender] : was nontender [Enlarged] : not enlarged [Purpura] : no purpura  [de-identified] : NAD, comfortable [de-identified] : Normocephalic, atraumatic. No scleral icterus.  [de-identified] : Supple, no JVD or cervical lymphadenopathy.  [de-identified] : No respiratory distress.  [de-identified] : +BS soft, nondistended. Abdominal tenderness. Multiple healed incisions. Purulent fistula present, dressing in place. Incisional hernia, reducible, nontender.

## 2024-04-04 NOTE — HISTORY OF PRESENT ILLNESS
[de-identified] : This is a 52 y/o female with a complex abdominal surgical history presents to for evaluation and management of  abdominal wall abscesses/hernia. Reports left groin pain, mid abdominal pain and abdominal drainage started in 2020. She currently is being followed by her general surgeon, . Has a drainage catheter for chronic abdominal abscesses, placed in 2021. Last CT scan in . \par  \par  Initially diagnosed with Crohn's disease , currently on Remicade q 4 weeks and Mercaptopurine, followed by BENJAMIN Cortez\par  PMH of AVM, seizures, aneurysm, desmoid tumor, PE 2017\par  \par  PSH of surgery for AVM 1997, coil placement for aneurysm May 1997, LEEP procedure for dysplasia 2001, , perianal rectal and vaginal reconstructive surgery for fistula 2007, laparoscopic hernia repair 17, emergency colostomy creation 17 for perforated transverse colon and DVT, colostomy reversed approx , possible in office wound debridement near former ostomy site w/ Dr. Ricks (unknown year), IR drainage of anterior abdominal wall fluid 20 and 21, abdominal catheter for fluid collection 21.\par  \par  Pt presents today to discuss abdominal abscesses, she continues to be followed by Gen surgeon Dr. Ricks and has a f/u soon.\par  \par  Reports symptoms began in December w/ mid abdominal pain and LLQ pelvic pain. Denies hx of fevers, nausea or vomiting.\par  \par  Has been followed by Dr. Ricks who ordered CT imaging and referred to IR for drainage.\par  Pt was previously on Augmentin x 14 days, then switched to Levaquin in January x 4-6 weeks, however stopped medication last month as per Ortho/Gen Surgeon due to knee pain from prolonged use\par  \par  Pt has been having approximately monthly imaging included CT or abd US typically at [facility] and occasionally at [facility]. - Does not believe she has had an actual CT since \par  Drain continues to have brown tinged clear discharge\par  Pt reports slightly soreness at former colostomy bag which has occurred intermittently over the last couple of years.\par  Denies fever, n/v/c/d\par  Denies passing flatus or stool via vagina or urethra, denies urine via anus [de-identified] : 4-: Returns to office. Overall about the same. Has had significant trouble losing weight. Remains with BMI of ~40 (5'3"  222lbs). Does have some persistent evidence of fistula vs foreign body infection of abdominal wall that has been managed by local wound care. Wishes to re-engage regarding abdominal wall.  6- - Returns to office today. Continues to have difficulty with weight management. Saw Dr. Winslow, no bariatric surgery was offered. Discussed with her today to consider discussion with endocrinology regarding newer medical weight loss options. Overall abdominal wall remains the same.   9-: Returns to office. She reports she is overall doing well today. She states she is continuing to struggle with weight loss, however she reports she has just joined a gym and is excited to get back into working out. She has seen dermatology for a recent episode of thigh abscess which spontaneously drained. Reports area is healing well with no pain or discomfort. She report she saw  x 1 month ago for discomfort at ostomy site and was prescribed antibiotics for possible infection. Reports she finished full course of antibiotics and pain has subsided. Continuing to see her GI doctor for management of crohns, she is switching medication of skyrizi. She denies any abdominal pain or discomfort. Denies any change in size of hernia. Continuing to change dressing daily.   3-3-23: Returns to office. Overall doing well. She has done great with weight loss and reports she is overall feeling well. Has been working with dietician. She denies any abdominal discomfort or pain.   8-25-23: Seen today in office. She reports she continues to have abdominal wall drainage with an increase in drainage x 3 weeks ago. She saw  for this and is s/p I&D with  on 8/7. She underwent a CT scan of the abdomen/pelvis on 8/10 which revealed a large bezoar and stable large left lower quadrant hernia with no evidence of obstruction. She denies any change in size of hernia. She denies significant pain, however, does have occasional discomfort. She denies any fever,n/v/d/c.   3-1-24: Returns to office. She states she is overall doing well. She states she continues to have abdominal wall drainage. She states over the past few weeks the volume of drainage has increased. She denies any change in size of the hernia. She does not have any significant pain, however, reports occasional soreness. She denies any fever, chills, nausea, vomiting.

## 2024-04-04 NOTE — ASSESSMENT
[FreeTextEntry1] : 55 y/o female with hx of complex abdominal surgeries, and Crohn's disease. Now with incisional hernia and abdominal wall abscess, with a fistulous tract extending to the skin of the periumbilical region. We discussed surgical plans for abdominal wall reconstruction, bowel resection and hernia repair. She does wish to hold off on surgery until May as she is in the process of moving and wishes to be in a building with an elevator for after surgery. We discussed she should RTC in April to rediscuss plan for surgery and be evaluated. Will need to also f/u with  prior to elective repair. All questions answered.   Plan: -RTC in April to discuss surgical planning

## 2024-04-04 NOTE — PLAN
[FreeTextEntry1] : I, Fanny Fletcher PA-C, am scribing for and the presence of Dr.Robert Reynoso the following sections HISTORY OF PRESENT ILLNESSS, PAST MEDICAL/FAMILY/SOCIAL HISTORY; REVIEW OF SYSTEMS; VITAL SIGNS; PHYSICAL EXAM; DISPOSITION.   I, Dr. Reynoso , personally performed the evaluation and management (E/M) services for this established patient who presents today with (a) new problem(s)/exacerbation of (an) existing condition(s). That E/M includes conducting the clinically appropriate interval history &/or exam, assessing all new/exacerbated conditions, and establishing a new plan of care. Today, my SERENA, OBI Talavera, was here to observe my evaluation and management service for this new problem/exacerbated condition and follow the plan of care established by me going forward

## 2024-04-12 ENCOUNTER — APPOINTMENT (OUTPATIENT)
Dept: SURGERY | Facility: CLINIC | Age: 54
End: 2024-04-12
Payer: MEDICARE

## 2024-04-12 VITALS
SYSTOLIC BLOOD PRESSURE: 126 MMHG | HEART RATE: 108 BPM | BODY MASS INDEX: 33.31 KG/M2 | DIASTOLIC BLOOD PRESSURE: 81 MMHG | TEMPERATURE: 97 F | WEIGHT: 181 LBS | HEIGHT: 62 IN | OXYGEN SATURATION: 97 %

## 2024-04-12 DIAGNOSIS — K43.2 INCISIONAL HERNIA W/OUT OBSTRUCTION OR GANGRENE: ICD-10-CM

## 2024-04-12 PROCEDURE — 99214 OFFICE O/P EST MOD 30 MIN: CPT

## 2024-04-15 ENCOUNTER — APPOINTMENT (OUTPATIENT)
Dept: COLORECTAL SURGERY | Facility: CLINIC | Age: 54
End: 2024-04-15
Payer: MEDICARE

## 2024-04-15 VITALS
BODY MASS INDEX: 33.31 KG/M2 | HEIGHT: 62 IN | TEMPERATURE: 97.4 F | HEART RATE: 100 BPM | DIASTOLIC BLOOD PRESSURE: 70 MMHG | SYSTOLIC BLOOD PRESSURE: 92 MMHG | WEIGHT: 181 LBS

## 2024-04-15 DIAGNOSIS — K50.90 CROHN'S DISEASE, UNSPECIFIED, W/OUT COMPLICATIONS: ICD-10-CM

## 2024-04-15 DIAGNOSIS — L02.211 CUTANEOUS ABSCESS OF ABDOMINAL WALL: ICD-10-CM

## 2024-04-15 DIAGNOSIS — L98.8 OTHER SPECIFIED DISORDERS OF THE SKIN AND SUBCUTANEOUS TISSUE: ICD-10-CM

## 2024-04-15 PROCEDURE — 99215 OFFICE O/P EST HI 40 MIN: CPT

## 2024-04-15 NOTE — HISTORY OF PRESENT ILLNESS
[FreeTextEntry1] : 53 yo F presents for follow up evaluation of Crohn's Disease   Initially diagnosed with Crohn's disease in , Initially diagnosed with Crohn's disease , was on Remicade q 4 weeks and Mercaptopurine, followed by BENJAMIN Cortez w/ plans to start Skyrizi (risankizumab-rzaa) 2022  PMH of AVM, seizures, aneurysm, desmoid tumor, PE 2017 PSH of surgery for AVM 1997, coil placement for aneurysm May 1997, LEEP procedure for dysplasia 2001, , perianal rectal and vaginal reconstructive surgery for fistula 2007, laparoscopic hernia repair 17, emergency colostomy creation 17 for perforated transverse colon and DVT, colostomy reversed approx , possible in office wound debridement near former ostomy site w/ Dr. Ricks (unknown year), IR drainage of anterior abdominal wall fluid 20 and 21, abdominal catheter for fluid collection 21.  Complex history in the setting of hernia repair with abdominal wall chronic fistula, patient initially seen by Dr. Reynoso 5/3/21 for discussion of hernia repair. Weight loss advised.  Seen 21 w/ anterior Anterior abdominal wall with IR drain in place. RLQ noted and on the keyanna-anal skin, a fistula in the 3:00 position and anterior anal verge-abscess. I&D performed.  Incidental right lung base nodular opacity noted on CT A/P by Dr. Reynoso and Dr. Chamberlain. She was seen by Dr. Christian 3/24/22, s/p CT chest 2021 and repeat in 3/2022.  CT chest on 21 - [id number] nodular opacity overlying the diaphragm in the RLL, previously measured [id number] on 21.  CT chest done on 3/7/22: - Since 2021, there has been a decrease in the size of a tubular opacity in the posterior right lower lobe, likely representing atelectasis or scar rather than a lung nodule.  Per Dr. Christian stable pulmonary nodule within subpleural RLL, recommended repeat CT in 1 year.  Seen by multiple providers in efforts for weight loss prior to discussion of abdominal wall surgery.  Seen in this office 22, recent episode of developing anterior thigh and medial thigh abscesses that required the use of topical antiabscess medication. The abscess spontaneously drained with resolution of the swelling. Purulent discharge without feculent material noted. Pain has resolved at this point. Symptoms occurred 3 weeks prior.  Saw primary GI . Colonoscopy performed. 2 polyps removed she reports. Was advised by primary GI that she will switch to Skirizi (risankizumab-rzaa). Evidence of prior punctate skin abscess along anterior and medial thigh. Pt recommended to use Hibiclens soap and recommended f/u with DERM  Seen by DERM Dr. Tripp on 22, advised to start benzoyl peroxide wash, started on topical clindamycin and tested for MRSA/MSSA carriage.  Seen by GEN SURG Dr. Reynoso on 22, encouraged on weight loss with physical activity, referral provided to dietician, recommend f/u in 3 months (Appt scheduled 3/3/23)  Seen by Bariatrics 10/19/22 and detailed nutrition plan discussed.  Seen in follow up 23, perianal skin excoriations and mild- moderate perianal excoriation (left lateral anal margin and right posterior evidence of quiescent anal fistula disease. No abscess. The sphincter tone was normal. There was no rectal tenderness present.  Of note, h/o chronic abdominal wound and hernia. Last seen by GEN SURG Edgardo on 3/3/2023, advised to continue weight loss and f/u in 4 mo for CT scan and consideration of surgery combined w/ plastics and CRS. Pt has since lost 20 lbs and goal to lose another 2-30 lbs by end of summer for consideration of surgery w/ Next f/u with Dr. Reynoso 23. Pt requesting to see wound care nurse for consultation.  office visit 2023, Exam notable for mild to moderate perianal excoriations left lateral anal margin and right posterior - evidence of quiescent anal fistula disease, no abscess. Evidence of multiple superficial skin ulcerations involving left and right anal margin as well as posterior medial gluteal cleft. Pt recommended continue with barrier protective ointment and f/u results of cultures obtained today. Pt encouraged to seek repeat evaluation with dermatology. Furthermore, in regard to her chronic abdominal fistula pt to f/u with weight loss and planned consultation with Dr. Reynoso, general surgery.  Interim, pt seen by Derm Dr. Laya Tripp 23, Groin cultures (+) MSSA then gram negative colonizers, no recent breakouts in thighs. Using BP and Clindamycin gel daily. Switching from Skyrizi to Remicade d/t hive with Skyrizi  Seen 23 for f/u: Exam notable for, area of 2 draining sinuses at the umbilicus. S/p I&D deep subcutaneous cavity identified with probe. Drain in place. Secondary area of erythema and induration along the left mid abdomen. Recommend CT scan for further evaluation as to extent of collection. Drain placed today.  CT A/P performed 8/10/23 Impression: 1. Impacted stool-filled loop of colon at the level of the suture line, compatible with a large bezoar. These findings were conveyed to Dr. Emeterio Echeverria of colorectal surgery at the time of this dictation. 2. Stable large left lower quadrant hernia without evidence of obstruction  CT chest ordered for surveillance of pulmonary nodule on 23 Impression: Since 2022; Unchanged irregular right basilar subpleural 1.1 cm nodule. Continued surveillance recommended. No new or enlarging pulmonary nodules  Seen by Dr. Reynoso 23, discussed possible treatment options including incisional hernia repair with possible mesh, abdominal wall reconstruction, bowel resection and enterocutaneous fistula takedown. Patient expressed understanding and wishes to proceed with surgery. Discussed will coordinate with Dr. cEheverria for combined surgery.   Seen in follow up  2023 due to continued abdominal wall drainage of pus and blood, on exam Gastrointestinal: Medial umbilical region with draining fistula/tracks and drain in place. Significant purulent drainage on the skin. Minimal induration. Mild erythema/contact associated. Secondary areas of sinus tracts noted along the left mid abdomen. Suspected colocutaneous/fistula/abscess. Recent bezoar noted on CT scan.  Recommend repeat CT scan for further assessment. Surgery was discussed with patient which she agreed to do. Plan is to takedown of this fistula and potential bowel resection with possible need for ostomy/fecal diversion. In addition plan for hernia repair with general surgery.  CT scan of Abdomen and Pelvis 2023: Stable midline anterior pelvic wall surgical defect without clear fistulous connection between the adjacent small bowel.  Seen for f/u 10/02/2023, reporting decreased pain but persistent drainage at the umbilicus/seton placed previously. Currently on Rinvoq for management of her Crohn's. On exam Medial umbilical region with draining fistula/tracks and drain in place. Significant purulent drainage on the skin. Minimal induration. Secondary area along the left mid abdomen of previous sinus tract closed. No erythema or induration. Nontender. Recommended to continue Crohn's management with GI. Repeat assessment in 6-8 weeks. If persistent drainage noted we will consider definitive surgery for bowel resection and abdominal hernia repair.  Patient seen 2023 for f/u, exam noted Medial umbilical region with draining fistula/tracks and drain in place. Significant purulent drainage on the skin. Drain in place. Small area along the left mid abdomen with 2 small punctate superficial pustules. No undermining fistula tract identified.  Patient wishes to continue with trial of medical management. Advised follow-up in 2 months. Advised follow-up promptly sooner if she develops symptoms of recurrent abdominal pain or secondary signs of infection.  Seen by Dr. Manuel 23 due to complaint of left flank wall abscess. Exam noted Reducible LLQ ventral hernia. Midline wound with penrose seton draining purulence with significant surrounding denuded skin. Left abdominal flank with an inflammatory fluctuant mass measuring roughly 5x4cm without drainage. S/p I&D. advised to f/u Dr. Echeverria as scheduled.  As per patient GI d/c Rinvoq due to patient having persistent fistulas and abscesses which are SE of the medication. Was started or Remicade 2023, due for next tx 24 and Mercaptopurine.  Most recently seen 24,  reports since s/p I &D of wall abscess in November has seen some small pustules. Continues to have drainage around umbilicus.   On exam, Medial umbilical region with drainage fistula / tracks and drain in place. Significant purulent drainage on the skin. Pt advised to repeat CT scan for further assessment. Anticipate with pt may move forward with plans for surgery in the near future to address her underlying fistula and abdominal wall defect.   CT A/P performed [facility] on 24 Impression:  Persistent long segment of abnormal small bowel adherent to the anterior abdominal wall at the superior margin of a persistent large infraumbilical ventral abdominal hernia with a fistulous tract extending to the skin of the periumbilical region; findings are consistent with chronic Crohn's enteritis.  In Interim, patient seen by Gen Surg Dr. Reynoso on 3/1/24, On abdominal exam, multiple healed incisions. Purulent fistula present, dressing in place. Incisional hernia reducible, nontender. Patient with h/o complex abdominal surgeries and Crohn's disease. Now with incisional hernia and abdominal wall abscess with fistulous tract extending to the skin of the periumbilical region. Discussed surgical plans for abdominal wall reconstruction and hernia repair. Pt wishes to hold off on surgery until May as she in the process of moving and wishes to be in a building with elevator for after surgery.   Patient presents today for surgical planning, states she due to process of moving will most likely plan for 2024.   Last Remicade infusion was 2024, next infusion date is 2024.   Patient reports continues with  drainage around umbilicus and reports certain areas of abdomen are very irritated from the use of gauze and tagaderm. Denies fever or chills.

## 2024-04-15 NOTE — ASSESSMENT
[FreeTextEntry1] : Patient scheduled with personal issues and wishes to postpone surgery till August.  Recommend enterostomal consultation for pouching.  Will tentatively plan surgery for August-however will require coordination with GI regarding biologic therapy cessation prior to surgery.  Will coordinate with general surgery as well.

## 2024-04-15 NOTE — PHYSICAL EXAM
[de-identified] : Medial umbilical region with draining fistula/tracks and drain in place.  Significant purulent drainage on the skin.  Significant erythema.  Multiple sinuses umbilicus.  Stoma appliance placed.

## 2024-04-16 PROBLEM — K43.2 INCISIONAL HERNIA, WITHOUT OBSTRUCTION OR GANGRENE: Status: ACTIVE | Noted: 2021-03-26

## 2024-04-16 NOTE — PHYSICAL EXAM
[Normal Breath Sounds] : Normal breath sounds [Normal Heart Sounds] : normal heart sounds [Normal Rate and Rhythm] : normal rate and rhythm [Alert] : alert [Oriented to Person] : oriented to person [Oriented to Place] : oriented to place [Oriented to Time] : oriented to time [Calm] : calm [Abdominal Masses] : No abdominal masses [Abdomen Tenderness] : ~T ~M No abdominal tenderness [Tender] : was nontender [Enlarged] : not enlarged [Purpura] : no purpura  [de-identified] : NAD, comfortable [de-identified] : Normocephalic, atraumatic. No scleral icterus.  [de-identified] : Supple, no JVD or cervical lymphadenopathy.  [de-identified] : No respiratory distress.  [de-identified] : +BS soft, nondistended. Abdominal tenderness. Multiple healed incisions. Purulent fistula present, dressing in place. Incisional hernia, reducible, nontender.

## 2024-04-16 NOTE — ASSESSMENT
[FreeTextEntry1] : 55 y/o female with hx of complex abdominal surgeries, and Crohn's disease. Now with incisional hernia and abdominal wall abscess, with a fistulous tract extending to the skin of the periumbilical region. Discussed option of surgical repair in detail, planning for abdominal wall reconstruction, bowel resection and hernia repair. Discussed can place hydrocortisone on skin to aid with irritation. Discussed should f/u with  and to further discuss and tentatively plan for combined surgery. She will RTC in July to plan for surgery in August and coordinate with  team. Continue local wound care. All questions answered.    Plan: -F/u with  -Apply hydrocortisone on skin irritation -Tentatively plan for surgery in August, RTC in July

## 2024-04-16 NOTE — HISTORY OF PRESENT ILLNESS
[de-identified] : This is a 52 y/o female with a complex abdominal surgical history presents to for evaluation and management of  abdominal wall abscesses/hernia. Reports left groin pain, mid abdominal pain and abdominal drainage started in 2020. She currently is being followed by her general surgeon, . Has a drainage catheter for chronic abdominal abscesses, placed in 2021. Last CT scan in . \par  \par  Initially diagnosed with Crohn's disease , currently on Remicade q 4 weeks and Mercaptopurine, followed by BENJAMIN Cortez\par  PMH of AVM, seizures, aneurysm, desmoid tumor, PE 2017\par  \par  PSH of surgery for AVM 1997, coil placement for aneurysm May 1997, LEEP procedure for dysplasia 2001, , perianal rectal and vaginal reconstructive surgery for fistula 2007, laparoscopic hernia repair 17, emergency colostomy creation 17 for perforated transverse colon and DVT, colostomy reversed approx , possible in office wound debridement near former ostomy site w/ Dr. Ricks (unknown year), IR drainage of anterior abdominal wall fluid 20 and 21, abdominal catheter for fluid collection 21.\par  \par  Pt presents today to discuss abdominal abscesses, she continues to be followed by Gen surgeon Dr. Ricks and has a f/u soon.\par  \par  Reports symptoms began in December w/ mid abdominal pain and LLQ pelvic pain. Denies hx of fevers, nausea or vomiting.\par  \par  Has been followed by Dr. Ricks who ordered CT imaging and referred to IR for drainage.\par  Pt was previously on Augmentin x 14 days, then switched to Levaquin in January x 4-6 weeks, however stopped medication last month as per Ortho/Gen Surgeon due to knee pain from prolonged use\par  \par  Pt has been having approximately monthly imaging included CT or abd US typically at [facility] and occasionally at [facility]. - Does not believe she has had an actual CT since \par  Drain continues to have brown tinged clear discharge\par  Pt reports slightly soreness at former colostomy bag which has occurred intermittently over the last couple of years.\par  Denies fever, n/v/c/d\par  Denies passing flatus or stool via vagina or urethra, denies urine via anus [de-identified] : 4-: Returns to office. Overall about the same. Has had significant trouble losing weight. Remains with BMI of ~40 (5'3"  222lbs). Does have some persistent evidence of fistula vs foreign body infection of abdominal wall that has been managed by local wound care. Wishes to re-engage regarding abdominal wall.  6- - Returns to office today. Continues to have difficulty with weight management. Saw Dr. Winslow, no bariatric surgery was offered. Discussed with her today to consider discussion with endocrinology regarding newer medical weight loss options. Overall abdominal wall remains the same.   9-: Returns to office. She reports she is overall doing well today. She states she is continuing to struggle with weight loss, however she reports she has just joined a gym and is excited to get back into working out. She has seen dermatology for a recent episode of thigh abscess which spontaneously drained. Reports area is healing well with no pain or discomfort. She report she saw  x 1 month ago for discomfort at ostomy site and was prescribed antibiotics for possible infection. Reports she finished full course of antibiotics and pain has subsided. Continuing to see her GI doctor for management of crohns, she is switching medication of skyrizi. She denies any abdominal pain or discomfort. Denies any change in size of hernia. Continuing to change dressing daily.   3-3-23: Returns to office. Overall doing well. She has done great with weight loss and reports she is overall feeling well. Has been working with dietician. She denies any abdominal discomfort or pain.   8-25-23: Seen today in office. She reports she continues to have abdominal wall drainage with an increase in drainage x 3 weeks ago. She saw  for this and is s/p I&D with  on 8/7. She underwent a CT scan of the abdomen/pelvis on 8/10 which revealed a large bezoar and stable large left lower quadrant hernia with no evidence of obstruction. She denies any change in size of hernia. She denies significant pain, however, does have occasional discomfort. She denies any fever,n/v/d/c.   3-1-24: Returns to office. She states she is overall doing well. She states she continues to have abdominal wall drainage. She states over the past few weeks the volume of drainage has increased. She denies any change in size of the hernia. She does not have any significant pain, however, reports occasional soreness. She denies any fever, chills, nausea, vomiting.   4-12-24: Seen today in office. She states she is overall feeling well. She reports the abdominal wall draiange continues, especially around the umbilicus. She has done well with weight loss, current BMI 33. She denies any change in size of the hernia. She reports occasional soreness at the hernia sight, she denies any pain or discomfort. She also reports some irriation surrounding the area d/t wound cleaning and using tegaderm/tape.  Denies any fever, chills, nausea, vomiting or constipation.

## 2024-05-30 ENCOUNTER — APPOINTMENT (OUTPATIENT)
Dept: DERMATOLOGY | Facility: CLINIC | Age: 54
End: 2024-05-30
Payer: MEDICARE

## 2024-05-30 DIAGNOSIS — L02.224 FURUNCLE OF GROIN: ICD-10-CM

## 2024-05-30 DIAGNOSIS — L71.9 ROSACEA, UNSPECIFIED: ICD-10-CM

## 2024-05-30 DIAGNOSIS — L30.4 ERYTHEMA INTERTRIGO: ICD-10-CM

## 2024-05-30 PROCEDURE — 99214 OFFICE O/P EST MOD 30 MIN: CPT

## 2024-05-30 RX ORDER — HYDROCORTISONE 25 MG/G
2.5 CREAM TOPICAL
Qty: 1 | Refills: 11 | Status: ACTIVE | COMMUNITY
Start: 2024-01-22 | End: 1900-01-01

## 2024-05-30 RX ORDER — KETOCONAZOLE 20 MG/G
2 CREAM TOPICAL
Qty: 1 | Refills: 11 | Status: ACTIVE | COMMUNITY
Start: 2024-01-22 | End: 1900-01-01

## 2024-05-30 RX ORDER — CLINDAMYCIN PHOSPHATE 1 G/10ML
1 GEL TOPICAL
Qty: 1 | Refills: 11 | Status: ACTIVE | COMMUNITY
Start: 2022-09-22 | End: 1900-01-01

## 2024-05-30 RX ORDER — METRONIDAZOLE 7.5 MG/G
0.75 CREAM TOPICAL
Qty: 1 | Refills: 11 | Status: ACTIVE | COMMUNITY
Start: 2022-09-22 | End: 1900-01-01

## 2024-05-30 NOTE — HISTORY OF PRESENT ILLNESS
[FreeTextEntry1] : FU furuncles, intertrigo [de-identified] : LV Jan 2024 for cbe on Remicade for IBD no hx skin cancer  intertrigo - improved with keto/hct  rosacea - improved w metrocream prn furuncles - clinda and hibiclens but only prn, not controlled

## 2024-05-30 NOTE — PHYSICAL EXAM
[Alert] : alert [Oriented x 3] : ~L oriented x 3 [Well Nourished] : well nourished [Conjunctiva Non-injected] : conjunctiva non-injected [No Visual Lymphadenopathy] : no visual  lymphadenopathy [No Clubbing] : no clubbing [No Edema] : no edema [No Bromhidrosis] : no bromhidrosis [No Chromhidrosis] : no chromhidrosis [FreeTextEntry3] : light brown skin  inframammary clear face clear inflam nodules bilateral thighs

## 2024-07-12 ENCOUNTER — APPOINTMENT (OUTPATIENT)
Dept: COLORECTAL SURGERY | Facility: CLINIC | Age: 54
End: 2024-07-12
Payer: MEDICARE

## 2024-07-12 ENCOUNTER — APPOINTMENT (OUTPATIENT)
Dept: SURGERY | Facility: CLINIC | Age: 54
End: 2024-07-12
Payer: MEDICARE

## 2024-07-12 VITALS
OXYGEN SATURATION: 97 % | WEIGHT: 180 LBS | TEMPERATURE: 97.3 F | DIASTOLIC BLOOD PRESSURE: 80 MMHG | HEART RATE: 98 BPM | SYSTOLIC BLOOD PRESSURE: 122 MMHG | HEIGHT: 62 IN | BODY MASS INDEX: 33.13 KG/M2

## 2024-07-12 VITALS
DIASTOLIC BLOOD PRESSURE: 88 MMHG | WEIGHT: 180 LBS | SYSTOLIC BLOOD PRESSURE: 119 MMHG | HEART RATE: 114 BPM | BODY MASS INDEX: 33.13 KG/M2 | HEIGHT: 62 IN | TEMPERATURE: 97.2 F

## 2024-07-12 DIAGNOSIS — L98.8 OTHER SPECIFIED DISORDERS OF THE SKIN AND SUBCUTANEOUS TISSUE: ICD-10-CM

## 2024-07-12 DIAGNOSIS — L03.019 CELLULITIS OF UNSPECIFIED FINGER: ICD-10-CM

## 2024-07-12 DIAGNOSIS — K50.90 CROHN'S DISEASE, UNSPECIFIED, W/OUT COMPLICATIONS: ICD-10-CM

## 2024-07-12 DIAGNOSIS — L02.211 CUTANEOUS ABSCESS OF ABDOMINAL WALL: ICD-10-CM

## 2024-07-12 DIAGNOSIS — K43.2 INCISIONAL HERNIA W/OUT OBSTRUCTION OR GANGRENE: ICD-10-CM

## 2024-07-12 PROCEDURE — 99214 OFFICE O/P EST MOD 30 MIN: CPT

## 2024-07-17 PROBLEM — L03.019 PARONYCHIA OF FINGER: Status: ACTIVE | Noted: 2024-07-17

## 2024-07-24 ENCOUNTER — APPOINTMENT (OUTPATIENT)
Dept: CT IMAGING | Facility: CLINIC | Age: 54
End: 2024-07-24
Payer: MEDICARE

## 2024-07-24 PROCEDURE — 74177 CT ABD & PELVIS W/CONTRAST: CPT | Mod: 26,MH

## 2024-08-05 ENCOUNTER — NON-APPOINTMENT (OUTPATIENT)
Age: 54
End: 2024-08-05

## 2024-08-05 PROBLEM — R93.89 ABNORMAL CHEST CT: Status: ACTIVE | Noted: 2024-08-05

## 2024-08-09 ENCOUNTER — APPOINTMENT (OUTPATIENT)
Dept: SURGERY | Facility: CLINIC | Age: 54
End: 2024-08-09

## 2024-08-15 ENCOUNTER — APPOINTMENT (OUTPATIENT)
Dept: DERMATOLOGY | Facility: CLINIC | Age: 54
End: 2024-08-15
Payer: MEDICARE

## 2024-08-15 DIAGNOSIS — L30.9 DERMATITIS, UNSPECIFIED: ICD-10-CM

## 2024-08-15 DIAGNOSIS — L98.9 DISORDER OF THE SKIN AND SUBCUTANEOUS TISSUE, UNSPECIFIED: ICD-10-CM

## 2024-08-15 DIAGNOSIS — R21 RASH AND OTHER NONSPECIFIC SKIN ERUPTION: ICD-10-CM

## 2024-08-15 PROCEDURE — 99214 OFFICE O/P EST MOD 30 MIN: CPT

## 2024-08-15 RX ORDER — MUPIROCIN 20 MG/G
2 OINTMENT TOPICAL
Qty: 1 | Refills: 3 | Status: ACTIVE | COMMUNITY
Start: 2024-08-15 | End: 1900-01-01

## 2024-08-15 RX ORDER — TRIAMCINOLONE ACETONIDE 1 MG/G
0.1 CREAM TOPICAL
Qty: 1 | Refills: 1 | Status: ACTIVE | COMMUNITY
Start: 2024-08-15 | End: 1900-01-01

## 2024-08-15 RX ORDER — PREDNISONE 20 MG/1
20 TABLET ORAL
Qty: 7 | Refills: 0 | Status: ACTIVE | COMMUNITY
Start: 2024-08-15 | End: 1900-01-01

## 2024-08-15 RX ORDER — HYDROXYZINE HYDROCHLORIDE 25 MG/1
25 TABLET ORAL
Qty: 30 | Refills: 1 | Status: ACTIVE | COMMUNITY
Start: 2024-08-15 | End: 1900-01-01

## 2024-08-16 ENCOUNTER — APPOINTMENT (OUTPATIENT)
Dept: CT IMAGING | Facility: CLINIC | Age: 54
End: 2024-08-16

## 2024-08-20 ENCOUNTER — NON-APPOINTMENT (OUTPATIENT)
Age: 54
End: 2024-08-20

## 2024-08-20 DIAGNOSIS — L08.9 LOCAL INFECTION OF THE SKIN AND SUBCUTANEOUS TISSUE, UNSPECIFIED: ICD-10-CM

## 2024-08-20 LAB — BACTERIA SPEC CULT: ABNORMAL

## 2024-08-20 RX ORDER — AMOXICILLIN AND CLAVULANATE POTASSIUM 500; 125 MG/1; MG/1
500-125 TABLET, FILM COATED ORAL
Qty: 14 | Refills: 0 | Status: ACTIVE | COMMUNITY
Start: 2024-08-20 | End: 1900-01-01

## 2024-08-20 NOTE — DATA REVIEWED
[FreeTextEntry1] :  CT chest on 08/24/23: -Since October 6, 2022; Unchanged irregular right basilar subpleural 1.1 cm nodule. Continued surveillance recommended. -No new or enlarging pulmonary nodules

## 2024-08-20 NOTE — HISTORY OF PRESENT ILLNESS
[FreeTextEntry1] : RPA- rash [de-identified] : Carleen Siu is a 55 yo F p/w rash. LV Dr Tripp 5/2024 ,was following for furuncles and intertrigo.   rash on face x 1 mo. stinging and irritated skin in nostrils and around nose. Has tried vaseline and cocoa butter, no improvement. Does not apply topical steroids or use steroid inhaler.  rash on body x few days. using new African bar soap around onset  PMH- Crohns disease with complex fistulas and abscesses, currently on infliximab. Has had chronic irritation around fistula site  x years. Thought it was the tape from her ostomy dressing. Switched to different adhesive which made a little better.

## 2024-08-20 NOTE — HISTORY OF PRESENT ILLNESS
[FreeTextEntry1] : 54 year old female, never smoker, with a PMHx of Crohn's disease ( on Remicade q 4 weeks and Mercaptopurine), AVM, seizures, aneurysm, desmoid tumor, PE 2017, LEEP procedure for dysplasia 2001, , perianal rectal and vaginal reconstructive surgery for fistula 2007, laparoscopic hernia repair 17, emergency colostomy creation 17 for perforated transverse colon, and DVT, colostomy reversed approx , IR drainage of anterior abdominal wall fluid 20 and 21, abdominal catheter for chronic abdominal abscess 21.   She was initially referred by Dr. Reynoso to Dr. Chamberlain in May 2021 for right lung base nodular opacity, incidentally found on CT abd and pelvis. Of note, she reports no family history of lung cancer or environmental exposures.   She presents today to review the results of her yearly surveillance CT:  CT chest: XXX

## 2024-08-20 NOTE — HISTORY OF PRESENT ILLNESS
[FreeTextEntry1] : RPA- rash [de-identified] : Carleen Siu is a 53 yo F p/w rash. LV Dr Tripp 5/2024 ,was following for furuncles and intertrigo.   rash on face x 1 mo. stinging and irritated skin in nostrils and around nose. Has tried vaseline and cocoa butter, no improvement. Does not apply topical steroids or use steroid inhaler.  rash on body x few days. using new African bar soap around onset  PMH- Crohns disease with complex fistulas and abscesses, currently on infliximab. Has had chronic irritation around fistula site  x years. Thought it was the tape from her ostomy dressing. Switched to different adhesive which made a little better.

## 2024-08-20 NOTE — ASSESSMENT
[FreeTextEntry1] : #Perinasal facial eruption x 1 month Unclear if his is same process as more recent rash on rest of body. i suspect may be separate process.  ddx concern for impetigo + perioral dermatitis  -swabbed nares today for bacterial and fungal cultrue start mupirocin oint TID to AA x 7 days start metrocream BID to AA on nasal dorsum and alar skin   #Acute generalized eczematous eruption x 1.5 week Favor contact derm vs. other eczematous dermatitis vs. ID reaction  -stop new african bar soap. gentle body wash like vanicream or cetaphil was recommended  -gentle emollient like cerave healing ointment frequently throughout the day -start triam 0.1 cream bid for 2 weeks, then prn to AA -start pred 20mg qd x 7 days. SED including immunosuppression, mood change, palpitation, hypertension, hyperglycemia -start hydroxyzine 25mg qHS for itching (since night time is worst for her)  #History of recurrent furunculosis and intertrigo Not addressed today  -RTC 3-4 weeks to monitor response. Pt instructed to call for any issues or new concerns. If no improvement, consider punch bx

## 2024-08-20 NOTE — PHYSICAL EXAM
[FreeTextEntry3] : nostril and perinasal skin: fissured scaling crusted erythematous plaques On the nose and medial cheeks, there are also scattered smooth monomorphic papules   arms, legs, chest, inguinal folds, abd, back - ill defined scattered scaling papules and plaques.  no involvement of fingers, toes, wrists.

## 2024-08-20 NOTE — ADDENDUM
[FreeTextEntry1] : Swab from face : + staph, rare e.coli. group b strep.  Spoke w/ pt over phone.  She says things are improving on face.  Recommended starting Augmentin 500mg -125mg BID x 7 days. No penicillin allergies in hx.  Continue mupirocin TID to AA on face SHe is in agreement w/ plan Has follow up with me 9/19

## 2024-08-20 NOTE — HISTORY OF PRESENT ILLNESS
[FreeTextEntry1] : RPA- rash [de-identified] : Carleen Siu is a 55 yo F p/w rash. LV Dr Tripp 5/2024 ,was following for furuncles and intertrigo.   rash on face x 1 mo. stinging and irritated skin in nostrils and around nose. Has tried vaseline and cocoa butter, no improvement. Does not apply topical steroids or use steroid inhaler.  rash on body x few days. using new African bar soap around onset  PMH- Crohns disease with complex fistulas and abscesses, currently on infliximab. Has had chronic irritation around fistula site  x years. Thought it was the tape from her ostomy dressing. Switched to different adhesive which made a little better.

## 2024-08-23 ENCOUNTER — APPOINTMENT (OUTPATIENT)
Dept: CT IMAGING | Facility: CLINIC | Age: 54
End: 2024-08-23
Payer: MEDICARE

## 2024-08-23 ENCOUNTER — OUTPATIENT (OUTPATIENT)
Dept: OUTPATIENT SERVICES | Facility: HOSPITAL | Age: 54
LOS: 1 days | End: 2024-08-23

## 2024-08-23 PROCEDURE — 71250 CT THORAX DX C-: CPT | Mod: 26,MH

## 2024-08-29 ENCOUNTER — APPOINTMENT (OUTPATIENT)
Dept: THORACIC SURGERY | Facility: CLINIC | Age: 54
End: 2024-08-29
Payer: MEDICARE

## 2024-08-29 VITALS
WEIGHT: 179 LBS | OXYGEN SATURATION: 98 % | HEART RATE: 95 BPM | BODY MASS INDEX: 32.94 KG/M2 | DIASTOLIC BLOOD PRESSURE: 77 MMHG | SYSTOLIC BLOOD PRESSURE: 128 MMHG | HEIGHT: 62 IN

## 2024-08-29 DIAGNOSIS — R91.1 SOLITARY PULMONARY NODULE: ICD-10-CM

## 2024-08-29 PROCEDURE — 99213 OFFICE O/P EST LOW 20 MIN: CPT

## 2024-08-30 NOTE — PHYSICAL EXAM
[Auscultation Breath Sounds / Voice Sounds] : lungs were clear to auscultation bilaterally [Examination Of The Chest] : the chest was normal in appearance [Chest Visual Inspection Thoracic Asymmetry] : no chest asymmetry [Diminished Respiratory Excursion] : normal chest expansion [Bowel Sounds] : normal bowel sounds [Abdomen Soft] : soft [Abdomen Tenderness] : non-tender [] : no hepato-splenomegaly [Abdomen Mass (___ Cm)] : no abdominal mass palpated [Abnormal Walk] : normal gait [Nail Clubbing] : no clubbing  or cyanosis of the fingernails [Musculoskeletal - Swelling] : no joint swelling seen [Motor Tone] : muscle strength and tone were normal [Oriented To Time, Place, And Person] : oriented to person, place, and time [Impaired Insight] : insight and judgment were intact [Affect] : the affect was normal

## 2024-08-30 NOTE — ASSESSMENT
[FreeTextEntry1] : Patient returns to review a CT scan for an 11 mm solid nodule in the base of the right lung. She is a never smoker and has no other significant risk factors. She is low risk for developing a primary lung cancer. Her nodule has been stable over years. This is likely a benign nodule given its stability and her personal history. Will not recommend further screening. She is undergoing surgery with Dr. Reynoso and Juwan in the future and there are no concerns from a thoracic standpoint for proceeding with planned surgery.   PLAN 1.  Follow up PRN    I, Dr. Christian, personally performed the evaluation and management (E/M) services for this established patient who presents today with (a) new problem(s)/exacerbation of (an) existing condition(s). That E/M includes conducting the clinically appropriate interval history &/or exam, assessing all new/exacerbated conditions, and establishing a new plan of care.

## 2024-08-30 NOTE — HISTORY OF PRESENT ILLNESS
[FreeTextEntry1] : 54 year old female, never smoker, with a PMHx of Crohn's disease ( on Remicade q 4 weeks and Mercaptopurine), AVM, seizures, aneurysm, desmoid tumor, PE 2017, LEEP procedure for dysplasia 2001, , perianal rectal and vaginal reconstructive surgery for fistula 2007, laparoscopic hernia repair 17, emergency colostomy creation 17 for perforated transverse colon, and DVT, colostomy reversed approx , IR drainage of anterior abdominal wall fluid 20 and 21, abdominal catheter for chronic abdominal abscess 21.   She was initially referred by Dr. Reynoso to Dr. Chamberlani in May 2021 for right lung base nodular opacity, incidentally found on CT abd and pelvis. She reports no family history of lung cancer or environmental exposures.   She presents today to review the results of her yearly surveillance CT:  CT chest 24: 1. An 8 mm nodular opacity in the right lower lobe has not significantly changed since 1/15/2021. 2. Cholelithiasis.

## 2024-08-30 NOTE — HISTORY OF PRESENT ILLNESS
[FreeTextEntry1] : 54 year old female, never smoker, with a PMHx of Crohn's disease ( on Remicade q 4 weeks and Mercaptopurine), AVM, seizures, aneurysm, desmoid tumor, PE 2017, LEEP procedure for dysplasia 2001, , perianal rectal and vaginal reconstructive surgery for fistula 2007, laparoscopic hernia repair 17, emergency colostomy creation 17 for perforated transverse colon, and DVT, colostomy reversed approx , IR drainage of anterior abdominal wall fluid 20 and 21, abdominal catheter for chronic abdominal abscess 21.   She was initially referred by Dr. Reynoso to Dr. Chamberlain in May 2021 for right lung base nodular opacity, incidentally found on CT abd and pelvis. She reports no family history of lung cancer or environmental exposures.   She presents today to review the results of her yearly surveillance CT:  CT chest 24: 1. An 8 mm nodular opacity in the right lower lobe has not significantly changed since 1/15/2021. 2. Cholelithiasis.

## 2024-09-19 ENCOUNTER — APPOINTMENT (OUTPATIENT)
Dept: DERMATOLOGY | Facility: CLINIC | Age: 54
End: 2024-09-19
Payer: MEDICARE

## 2024-09-19 DIAGNOSIS — L73.2 HIDRADENITIS SUPPURATIVA: ICD-10-CM

## 2024-09-19 DIAGNOSIS — L73.9 FOLLICULAR DISORDER, UNSPECIFIED: ICD-10-CM

## 2024-09-19 PROCEDURE — 99214 OFFICE O/P EST MOD 30 MIN: CPT | Mod: 25

## 2024-09-19 PROCEDURE — 11900 INJECT SKIN LESIONS </W 7: CPT

## 2024-09-19 RX ORDER — CLINDAMYCIN PHOSPHATE 10 MG/ML
1 LOTION TOPICAL
Qty: 1 | Refills: 2 | Status: ACTIVE | COMMUNITY
Start: 2024-09-19 | End: 1900-01-01

## 2024-09-19 RX ORDER — DOXYCYCLINE HYCLATE 100 MG/1
100 CAPSULE ORAL
Qty: 60 | Refills: 2 | Status: ACTIVE | COMMUNITY
Start: 2024-09-19 | End: 1900-01-01

## 2024-09-19 NOTE — HISTORY OF PRESENT ILLNESS
[FreeTextEntry1] : RPA- staph infection on face,  [de-identified] : Carleen Siu 55 y/o F presents for a F/U of the above and inflamed bumps on her right inner thigh.   1)Rash on face around nose improved but still feels dry. Using metrocream daily.  2) Dermatitis improved following pred.  - Has improved but noticed new bumps on chest and back 2 days ago.   3)Inflamed bumps on right inner thigh. worried about fistula from Crohn's.   PMH- Crohns disease with complex fistulas and abscesses, currently on infliximab. Has had chronic irritation around fistula site x years. Thought it was the tape from her ostomy dressing. Switched to different adhesive which made a little better.

## 2024-09-19 NOTE — ASSESSMENT
[FreeTextEntry1] : #Furunculosis vs. HS, mcclain 2 - flaring on R medial thigh Chronic nature of conditions discussed. Pt with hx Crohn's, on infliximab.  -start doxycycline 100mg BID x 12 weeks -start clinda 1% lotion BID to AA -warm compresses frequently  -ILK 20mg/cc to 3 nodules today for total of 1.0cc -RTC if worsening over next week, otherwise follow up 12 weeks.   ILK 40 Lot Number: [id number] Exp: Feb 2026  #Bacterial impetigo - face  bacterail culture- +MSSA, rare e.coli  s/p PO augmentin -appears resolved today.  -for irritated skin: start gentle emollient , samples of cerave healing ointment provided  #Rosacea  c/w metrocream BID   #Acute generalized eczematous eruption x 1.5 week - resolved Favor contact derm vs. other eczematous dermatitis vs. ID reaction  Inactive s/p pred and TAC cream.  -c/w gentle body wash like vanicream or cetaphil was recommended  -gentle emollient like cerave healing ointment frequently throughout the day -c/w hydroxyzine 25mg qHS for itching    -RTC 12 weeks or sooner prn

## 2024-09-19 NOTE — ASSESSMENT
[FreeTextEntry1] : #Furunculosis vs. HS, mcclain 2 - flaring on R medial thigh Chronic nature of conditions discussed. Pt with hx Crohn's, on infliximab.  -start doxycycline 100mg BID x 12 weeks -start clinda 1% lotion BID to AA -warm compresses frequently  -ILK 20mg/cc to 3 nodules today for total of 1.0cc -RTC if worsening over next week, otherwise follow up 12 weeks.   ILK 40 Lot Number: [id number] Exp: Feb 2026  #Bacterial impetigo - face  bacterail culture- +MSSA, rare e.coli  s/p PO augmentin -appears resolved today.  -for irritated skin: start gentle emollient , samples of cerave healing ointment provided  #Rosacea  c/w metrocream BID   #Acute generalized eczematous eruption x 1.5 week - resolved Favor contact derm vs. other eczematous dermatitis vs. ID reaction  Inactive s/p pred and TAC cream.  -c/w gentle body wash like vanicream or cetaphil was recommended  -gentle emollient like cerave healing ointment frequently throughout the day -c/w hydroxyzine 25mg qHS for itching    -RTC 12 weeks or sooner prn

## 2024-09-19 NOTE — HISTORY OF PRESENT ILLNESS
[FreeTextEntry1] : RPA- staph infection on face,  [de-identified] : Carleen Siu 53 y/o F presents for a F/U of the above and inflamed bumps on her right inner thigh.   1)Rash on face around nose improved but still feels dry. Using metrocream daily.  2) Dermatitis improved following pred.  - Has improved but noticed new bumps on chest and back 2 days ago.   3)Inflamed bumps on right inner thigh. worried about fistula from Crohn's.   PMH- Crohns disease with complex fistulas and abscesses, currently on infliximab. Has had chronic irritation around fistula site x years. Thought it was the tape from her ostomy dressing. Switched to different adhesive which made a little better.

## 2024-09-19 NOTE — PHYSICAL EXAM
[FreeTextEntry3] : nostril and perinasal skin: mild scaling. on nasal tip and ala. , light hyperpigmented patches   chest, back, sides: scattered perifollicular papules  right medial thigh: erythematous cystic nodules and papules . background of scarring on bilateral medial thighs.   axilla and chest: clear

## 2024-09-30 ENCOUNTER — APPOINTMENT (OUTPATIENT)
Dept: COLORECTAL SURGERY | Facility: CLINIC | Age: 54
End: 2024-09-30
Payer: MEDICARE

## 2024-09-30 VITALS
DIASTOLIC BLOOD PRESSURE: 81 MMHG | HEIGHT: 62 IN | TEMPERATURE: 98.3 F | HEART RATE: 75 BPM | SYSTOLIC BLOOD PRESSURE: 117 MMHG | WEIGHT: 182 LBS | BODY MASS INDEX: 33.49 KG/M2

## 2024-09-30 DIAGNOSIS — L73.9 FOLLICULAR DISORDER, UNSPECIFIED: ICD-10-CM

## 2024-09-30 PROCEDURE — 10061 I&D ABSCESS COMP/MULTIPLE: CPT

## 2024-09-30 PROCEDURE — 99212 OFFICE O/P EST SF 10 MIN: CPT | Mod: 25

## 2024-09-30 NOTE — PHYSICAL EXAM
[de-identified] : Medial umbilical region with draining fistula/tracks and drain in place.  Significant purulent drainage on the skin.  Significant erythema.  multiple sinuses umbilicus.  Stoma appliance placed.   [de-identified] : 2 abscesses along the right medial thigh.  Incision and drainage performed.  Purulent material obtained.

## 2024-09-30 NOTE — HISTORY OF PRESENT ILLNESS
[FreeTextEntry1] : 55 y/o F presents for follow up evaluation of Crohn's disease and pruritus ani.  Patient initially diagnosed with Crohn's disease in , Initially diagnosed with Crohn's disease , was on Remicade q 4 weeks and Mercaptopurine, followed by BENJAMIN Cortez w/ plans to start Skyrizi (risankizumab-rzaa) 2022  PMH of AVM, seizures, aneurysm, desmoid tumor, PE 2017 PSH of surgery for AVM 1997, coil placement for aneurysm May 1997, LEEP procedure for dysplasia 2001, , perianal rectal and vaginal reconstructive surgery for fistula 2007, laparoscopic hernia repair 17, emergency colostomy creation 17 for perforated transverse colon and DVT, colostomy reversed approx , possible in office wound debridement near former ostomy site w/ Dr. Ricks (unknown year), IR drainage of anterior abdominal wall fluid 20 and 21, abdominal catheter for fluid collection 21.  Complex history in the setting of hernia repair with abdominal wall chronic fistula, patient initially seen by Dr. Reynoso 5/3/21 for discussion of hernia repair. Weight loss advised.  Seen 21 w/ anterior Anterior abdominal wall with IR drain in place. RLQ noted and on the keyanna-anal skin, a fistula in the 3:00 position and anterior anal verge-abscess. I&D performed.  Incidental right lung base nodular opacity noted on CT A/P by Dr. Reynoso and Dr. Chamberlain. She was seen by Dr. Christian 3/24/22, s/p CT chest 2021 and repeat in 3/2022.  CT chest on 21 - [id number] nodular opacity overlying the diaphragm in the RLL, previously measured [id number] on 21.  CT chest done on 3/7/22: - Since 2021, there has been a decrease in the size of a tubular opacity in the posterior right lower lobe, likely representing atelectasis or scar rather than a lung nodule.  Per Dr. Christian stable pulmonary nodule within subpleural RLL, recommended repeat CT in 1 year.  Seen by multiple providers in efforts for weight loss prior to discussion of abdominal wall surgery.  Seen in this office 22, recent episode of developing anterior thigh and medial thigh abscesses that required the use of topical antiabscess medication. The abscess spontaneously drained with resolution of the swelling. Purulent discharge without feculent material noted. Pain has resolved at this point. Symptoms occurred 3 weeks prior.  Recently saw primary GI . Colonoscopy performed. 2 polyps removed she reports. Was advised by primary GI that she will switch to Skirizi (risankizumab-rzaa). Evidence of prior punctate skin abscess along anterior and medial thigh. Pt recommended to use Hibiclens soap and recommended f/u with DERM  Seen by DERM Dr. Tripp on 22, advised to start benzoyl peroxide wash, started on topical clindamycin and tested for MRSA/MSSA carriage.  Seen by GEN SURG Dr. Reynoso on 22, encouraged on weight loss with physical activity, referral provided to dietician, recommend f/u in 3 months (Appt scheduled 3/3/23)  Seen by Bariatrics 10/19/22 and detailed nutrition plan discussed.  Seen in follow up 23, perianal skin excoriations and mild- moderate perianal excoriation (left lateral anal margin and right posterior evidence of quiescent anal fistula disease. No abscess. The sphincter tone was normal. There was no rectal tenderness present.  Of note, h/o chronic abdominal wound and hernia. Last seen by GEN SURG Edgardo on 3/3, advised to continue weight loss and f/u in 4 mo for CT scan and consideration of surgery combined w/ plastics and CRS. Pt has since lost 20 lbs and goal to lose another 2-30 lbs by end of summer for consideration of surgery w/ Next f/u with Dr. Reynoso 23. Pt requesting to see wound care nurse for consultation.  office visit 2023, Exam notable for mild to moderate perianal excoriations left lateral anal margin and right posterior - evidence of quiescent anal fistula disease, no abscess. Evidence of multiple superficial skin ulcerations involving left and right anal margin as well as posterior medial gluteal cleft. Pt recommended continue with barrier protective ointment and f/u results of cultures obtained today. Pt encouraged to seek repeat evaluation with dermatology. Furthermore, in regard to her chronic abdominal fistula pt to f/u with weight loss and planned consultation with Dr. Reynoso, general surgery.  Interim, pt seen by Jyothi Tripp 23, Groin cultures (+) MSSA then gram negative colonizers, no recent breakouts in thighs. Using BP and Clindamycin gel daily. Switching from Skyrizi to Remicade d/t hive with Skyrizi  Seen 23, pt reports 1 week of increasing drainage from umbilicus with subsequent 3 days of increasing pain along the left mid abdomen associated swelling. Spontaneous drainage noted yesterday. Exam notable for, area of 2 draining sinuses at the umbilicus. S/p I&D deep subcutaneous cavity identified with probe. Drain in place. Secondary area of erythema and induration along the left mid abdomen. S/p I&D.  Multifocal abdominal wall abscess with secondary collection/infected mesh. Incision and drainage performed today.  Recommend CT scan for further evaluation as to extent of collection. Drain placed today.  CT A/P performed 8/10/23 Impression: 1. Impacted stool-filled loop of colon at the level of the suture line, compatible with a large bezoar. These findings were conveyed to Dr. Emeterio Echeverria of colorectal surgery at the time of this dictation. 2. Stable large left lower quadrant hernia without evidence of obstruction  CT chest ordered for surveillance of pulmonary nodule on 23 Impression: Since 2022; Unchanged irregular right basilar subpleural 1.1 cm nodule. Continued surveillance recommended. No new or enlarging pulmonary nodules  Interim, patient seen by Dr. Reynoso 23, discussed possible treatment options including incisional hernia repair with possible mesh, abdominal wall reconstruction, bowel resection and enterocutaneous fistula takedown. Patient expressed understanding and wishes to proceed with surgery. Discussed will coordinate with Dr. Echeverria for combined surgery.  Seen 2023 due to continued abdominal wall drainage of pus and blood, on exam Gastrointestinal: Medial umbilical region with draining fistula/tracks and drain in place. Significant purulent drainage on the skin. Minimal induration. Mild erythema/contact associated. Secondary areas of sinus tracts noted along the left mid abdomen. denied fever and chills at time suspected colocutaneous/fistula/abscess. Recent bezoar noted on CT scan.  Recommend repeat CT scan for further assessment. Surgery was discussed with patient which she agreed to do. Plan is to takedown of this fistula and potential bowel resection with possible need for ostomy/fecal diversion. In addition plan for hernia repair with general surgery.  Last seen 24 for follow up. Persistent drainage at the umbilicus/seton placed previously.  Patient with pending social service issues regarding her move and living arrangements Advised after clarification and resolution of recurrent social service issues she consider options for treatment of this chronic fistulous abscess. In the setting of failure medical treatment goal of surgery was outlined. The need for definitive resection of the abdominal wall abscess/fistula process and reconstruction was detailed.  24 CT Abdomen and Pelvis  IMPRESSION: 1.  Multiple matted loops of small bowel noted in midline anterior pelvis, adjacent to an open surgical wound. Extraluminal fluid collection extending from this region to the skin, suspicious for a site of small bowel cutaneous fistula. If a more clear definition of this area is necessary, recommend repeating study with positive enteric contrast. 2.  Stable post partial colectomy surgical bed. No bowel obstruction  In interim, has been following up with dermatology for folliculitis and hidradenitis suppurativa last seen 24 and Rxed Clindamycin topical and PO Doxycycline.  Patient presents today with c/o 2 painful bumps on right inner thigh for the past 3 days, States easily irritated with walking. Has been doing warm compressed with no relief in symptoms. Denies taking Ibuprofen or Tylenol for pain. Patient interested in I&D to relieve discomfort.   Currently on Remicade IV every month at [facility]. Mercaptopurine.

## 2024-10-28 ENCOUNTER — APPOINTMENT (OUTPATIENT)
Dept: COLORECTAL SURGERY | Facility: CLINIC | Age: 54
End: 2024-10-28
Payer: MEDICARE

## 2024-10-28 VITALS
TEMPERATURE: 98.3 F | HEIGHT: 63 IN | WEIGHT: 181 LBS | BODY MASS INDEX: 32.07 KG/M2 | HEART RATE: 106 BPM | SYSTOLIC BLOOD PRESSURE: 109 MMHG | DIASTOLIC BLOOD PRESSURE: 77 MMHG

## 2024-10-28 DIAGNOSIS — L02.31 CUTANEOUS ABSCESS OF BUTTOCK: ICD-10-CM

## 2024-10-28 PROCEDURE — 10060 I&D ABSCESS SIMPLE/SINGLE: CPT

## 2024-10-28 PROCEDURE — 99212 OFFICE O/P EST SF 10 MIN: CPT | Mod: 25

## 2024-11-05 ENCOUNTER — NON-APPOINTMENT (OUTPATIENT)
Age: 54
End: 2024-11-05

## 2024-11-05 LAB — BACTERIA WND CULT: ABNORMAL

## 2024-11-14 ENCOUNTER — APPOINTMENT (OUTPATIENT)
Dept: DERMATOLOGY | Facility: CLINIC | Age: 54
End: 2024-11-14

## 2024-11-15 ENCOUNTER — APPOINTMENT (OUTPATIENT)
Dept: COLORECTAL SURGERY | Facility: CLINIC | Age: 54
End: 2024-11-15
Payer: MEDICARE

## 2024-11-15 VITALS
TEMPERATURE: 98.1 F | HEART RATE: 87 BPM | WEIGHT: 183 LBS | BODY MASS INDEX: 32.43 KG/M2 | DIASTOLIC BLOOD PRESSURE: 89 MMHG | SYSTOLIC BLOOD PRESSURE: 140 MMHG | HEIGHT: 63 IN

## 2024-11-15 DIAGNOSIS — L73.9 FOLLICULAR DISORDER, UNSPECIFIED: ICD-10-CM

## 2024-11-15 DIAGNOSIS — L02.211 CUTANEOUS ABSCESS OF ABDOMINAL WALL: ICD-10-CM

## 2024-11-15 PROCEDURE — 99213 OFFICE O/P EST LOW 20 MIN: CPT

## 2025-01-08 ENCOUNTER — APPOINTMENT (OUTPATIENT)
Dept: DERMATOLOGY | Facility: CLINIC | Age: 55
End: 2025-01-08

## 2025-01-10 ENCOUNTER — APPOINTMENT (OUTPATIENT)
Dept: DERMATOLOGY | Facility: CLINIC | Age: 55
End: 2025-01-10

## 2025-01-31 ENCOUNTER — APPOINTMENT (OUTPATIENT)
Dept: DERMATOLOGY | Facility: CLINIC | Age: 55
End: 2025-01-31

## 2025-03-11 ENCOUNTER — APPOINTMENT (OUTPATIENT)
Dept: DERMATOLOGY | Facility: CLINIC | Age: 55
End: 2025-03-11

## 2025-04-07 ENCOUNTER — APPOINTMENT (OUTPATIENT)
Dept: DERMATOLOGY | Facility: CLINIC | Age: 55
End: 2025-04-07

## 2025-04-09 ENCOUNTER — APPOINTMENT (OUTPATIENT)
Dept: DERMATOLOGY | Facility: CLINIC | Age: 55
End: 2025-04-09
Payer: MEDICARE

## 2025-04-09 ENCOUNTER — NON-APPOINTMENT (OUTPATIENT)
Age: 55
End: 2025-04-09

## 2025-04-09 DIAGNOSIS — Z92.25 PERSONAL HISTORY OF IMMUNOSUPRESSION THERAPY: ICD-10-CM

## 2025-04-09 DIAGNOSIS — L73.2 HIDRADENITIS SUPPURATIVA: ICD-10-CM

## 2025-04-09 DIAGNOSIS — L29.9 PRURITUS, UNSPECIFIED: ICD-10-CM

## 2025-04-09 DIAGNOSIS — K50.90 CROHN'S DISEASE, UNSPECIFIED, W/OUT COMPLICATIONS: ICD-10-CM

## 2025-04-09 DIAGNOSIS — B35.4 TINEA CORPORIS: ICD-10-CM

## 2025-04-09 PROCEDURE — 99214 OFFICE O/P EST MOD 30 MIN: CPT

## 2025-04-09 PROCEDURE — G2211 COMPLEX E/M VISIT ADD ON: CPT

## 2025-04-09 RX ORDER — DOXYCYCLINE HYCLATE 100 MG/1
100 CAPSULE ORAL
Qty: 60 | Refills: 2 | Status: ACTIVE | COMMUNITY
Start: 2025-04-09 | End: 1900-01-01

## 2025-04-09 RX ORDER — LEVOCETIRIZINE DIHYDROCHLORIDE 5 MG/1
5 TABLET ORAL
Qty: 60 | Refills: 0 | Status: ACTIVE | COMMUNITY
Start: 2025-04-09 | End: 1900-01-01

## 2025-04-09 RX ORDER — KETOCONAZOLE 20 MG/ML
2 SHAMPOO TOPICAL
Qty: 2 | Refills: 2 | Status: ACTIVE | COMMUNITY
Start: 2025-04-09 | End: 1900-01-01

## 2025-04-09 RX ORDER — TERBINAFINE HYDROCHLORIDE 250 MG/1
250 TABLET ORAL DAILY
Qty: 30 | Refills: 0 | Status: ACTIVE | COMMUNITY
Start: 2025-04-09 | End: 1900-01-01

## 2025-04-30 ENCOUNTER — TRANSCRIPTION ENCOUNTER (OUTPATIENT)
Age: 55
End: 2025-04-30

## 2025-06-27 NOTE — ASSESSMENT
[FreeTextEntry1] : 54 y/o female with hx of complex abdominal surgeries, and Crohn's disease. Now with incisional hernia and abdominal wall abscess. Discussed surgical options.   We discussed the risk, benefits and alternatives to incisional hernia repair with possible mesh, abdominal wall reconstruction. bowel resection and enterocutaneous fistula takedown, in detail including but not limited to bleeding, infection, death, disability, blood clots, cardiac, pulmonary, neurological problems, prolonged hospital course, need for additional procedures, need for implants, recurrence of the hernia, displeasure with cosmetic outcome and other issues. Patient expressed understanding and wishes to proceed with surgery. All questions were answered.  Discussed will coordinate with  for combined surgery.   Plan: - incisional hernia repair with possible mesh, abdominal wall reconstruction, possible bowel resection and enterocutaneous fistula takedown -Coordinate with  for combined surgery. 
2/2 housing/food insecurity/difficulty with food procurement/preparation/other (specify)

## 2025-08-07 ENCOUNTER — APPOINTMENT (OUTPATIENT)
Dept: DERMATOLOGY | Facility: CLINIC | Age: 55
End: 2025-08-07
Payer: MEDICARE

## 2025-08-07 DIAGNOSIS — L73.2 HIDRADENITIS SUPPURATIVA: ICD-10-CM

## 2025-08-07 DIAGNOSIS — L20.9 ATOPIC DERMATITIS, UNSPECIFIED: ICD-10-CM

## 2025-08-07 DIAGNOSIS — K50.90 CROHN'S DISEASE, UNSPECIFIED, W/OUT COMPLICATIONS: ICD-10-CM

## 2025-08-07 DIAGNOSIS — Z92.25 PERSONAL HISTORY OF IMMUNOSUPRESSION THERAPY: ICD-10-CM

## 2025-08-07 PROCEDURE — 99214 OFFICE O/P EST MOD 30 MIN: CPT

## 2025-08-07 PROCEDURE — G2211 COMPLEX E/M VISIT ADD ON: CPT

## 2025-08-07 RX ORDER — FLUOCINONIDE 0.5 MG/G
0.05 CREAM TOPICAL
Qty: 1 | Refills: 2 | Status: ACTIVE | COMMUNITY
Start: 2025-08-07 | End: 1900-01-01

## 2025-08-07 RX ORDER — DUPILUMAB 300 MG/2ML
300 INJECTION, SOLUTION SUBCUTANEOUS
Qty: 2 | Refills: 11 | Status: ACTIVE | COMMUNITY
Start: 2025-08-07 | End: 1900-01-01

## 2025-08-07 RX ORDER — METRONIDAZOLE 7.5 MG/G
0.75 CREAM TOPICAL
Qty: 1 | Refills: 6 | Status: ACTIVE | COMMUNITY
Start: 2025-08-07 | End: 1900-01-01

## 2025-08-07 RX ORDER — PREDNISONE 20 MG/1
20 TABLET ORAL
Qty: 21 | Refills: 0 | Status: ACTIVE | COMMUNITY
Start: 2025-08-07 | End: 1900-01-01

## 2025-08-08 RX ORDER — DUPILUMAB 300 MG/2ML
300 INJECTION, SOLUTION SUBCUTANEOUS
Qty: 1 | Refills: 0 | Status: ACTIVE | COMMUNITY
Start: 2025-08-07 | End: 1900-01-01

## 2025-08-13 ENCOUNTER — OUTPATIENT (OUTPATIENT)
Dept: OUTPATIENT SERVICES | Facility: HOSPITAL | Age: 55
LOS: 1 days | End: 2025-08-13

## 2025-08-13 ENCOUNTER — APPOINTMENT (OUTPATIENT)
Dept: PRIMARY CARE | Facility: CLINIC | Age: 55
End: 2025-08-13

## 2025-08-13 ENCOUNTER — NON-APPOINTMENT (OUTPATIENT)
Age: 55
End: 2025-08-13

## 2025-09-11 ENCOUNTER — NON-APPOINTMENT (OUTPATIENT)
Age: 55
End: 2025-09-11